# Patient Record
Sex: MALE | Race: BLACK OR AFRICAN AMERICAN | Employment: UNEMPLOYED | RURAL
[De-identification: names, ages, dates, MRNs, and addresses within clinical notes are randomized per-mention and may not be internally consistent; named-entity substitution may affect disease eponyms.]

---

## 2017-02-15 ENCOUNTER — OFFICE VISIT (OUTPATIENT)
Dept: INTERNAL MEDICINE CLINIC | Age: 62
End: 2017-02-15

## 2017-02-15 VITALS
WEIGHT: 120 LBS | TEMPERATURE: 98.5 F | BODY MASS INDEX: 19.29 KG/M2 | RESPIRATION RATE: 16 BRPM | HEART RATE: 76 BPM | DIASTOLIC BLOOD PRESSURE: 101 MMHG | OXYGEN SATURATION: 98 % | SYSTOLIC BLOOD PRESSURE: 158 MMHG | HEIGHT: 66 IN

## 2017-02-15 DIAGNOSIS — M17.11 ARTHRITIS OF KNEE, RIGHT: ICD-10-CM

## 2017-02-15 DIAGNOSIS — I10 ESSENTIAL HYPERTENSION: Primary | ICD-10-CM

## 2017-02-15 DIAGNOSIS — R05.9 COUGH: ICD-10-CM

## 2017-02-15 RX ORDER — HYDROCHLOROTHIAZIDE 25 MG/1
25 TABLET ORAL DAILY
Qty: 90 TAB | Refills: 3 | Status: SHIPPED | OUTPATIENT
Start: 2017-02-15 | End: 2018-02-12 | Stop reason: SDUPTHER

## 2017-02-15 RX ORDER — NAPROXEN 500 MG/1
500 TABLET ORAL 2 TIMES DAILY WITH MEALS
Qty: 60 TAB | Refills: 1 | Status: SHIPPED | OUTPATIENT
Start: 2017-02-15 | End: 2019-06-18 | Stop reason: ALTCHOICE

## 2017-02-15 RX ORDER — AZITHROMYCIN 250 MG/1
250 TABLET, FILM COATED ORAL SEE ADMIN INSTRUCTIONS
Qty: 6 TAB | Refills: 0 | Status: SHIPPED | OUTPATIENT
Start: 2017-02-15 | End: 2017-04-25 | Stop reason: ALTCHOICE

## 2017-02-15 NOTE — PROGRESS NOTES
Subjective:     Franklin Kaur is a 63 y.o. male who presents for follow up of hypertension. He has been out of his blood pressure pills for the last couple of days. New concerns: cough x > 2mo,. No fevers chills night sweats. Cough is nonproductive, he does not think it is a smoker's cough. Complains of his right knee hurting times a few months. No injury. Not tried anything for it. Current Outpatient Prescriptions   Medication Sig Dispense Refill    hydrochlorothiazide (HYDRODIURIL) 25 mg tablet Take 1 Tab by mouth daily. 90 Tab 3    aspirin 81 mg chewable tablet Take 1 Tab by mouth daily. 90 Tab 3     No Known Allergies    Diet and Lifestyle: smoker last cigarette 3 weeks ago    Cardiovascular ROS: not taking medications regularly as instructed, no medication side effects noted, no TIA's, no chest pain on exertion, no dyspnea on exertion, no swelling of ankles. Review of Systems, additional:  Pertinent items are noted in HPI. Patient Active Problem List    Diagnosis Date Noted    Tobacco abuse 05/05/2014    Closed fracture of greater trochanter of femur ([facility] [address] 02/21/2012    Periprosthetic osteolysis of internal prosthetic joint ([facility] [address] 02/21/2012     Current Outpatient Prescriptions   Medication Sig Dispense Refill    hydroCHLOROthiazide (HYDRODIURIL) 25 mg tablet Take 1 Tab by mouth daily. 90 Tab 3    aspirin 81 mg chewable tablet Take 1 Tab by mouth daily.  90 Tab 3     No Known Allergies     Lab Results  Component Value Date/Time   WBC 5.1 12/30/2015 10:33 AM   Hemoglobin (POC) 15.3 06/16/2014 07:34 AM   HGB 14.7 12/30/2015 10:33 AM   Hematocrit (POC) 45 06/16/2014 07:34 AM   HCT 43.4 12/30/2015 10:33 AM   PLATELET 711 87/32/6230 10:33 AM   MCV 90 12/30/2015 10:33 AM       Lab Results  Component Value Date/Time   Cholesterol, total 168 12/30/2015 10:33 AM   HDL Cholesterol 99 12/30/2015 10:33 AM   LDL, calculated 55 12/30/2015 10:33 AM   Triglyceride 70 12/30/2015 10:33 AM       Lab Results  Component Value Date/Time   ALT (SGPT) 19 12/30/2015 10:33 AM   AST (SGOT) 28 12/30/2015 10:33 AM   Alk. phosphatase 85 12/30/2015 10:33 AM   Bilirubin, total 0.5 12/30/2015 10:33 AM       Lab Results  Component Value Date/Time   GFR est  12/30/2015 10:33 AM   GFR est non- 12/30/2015 10:33 AM   Creatinine (POC) 0.7 06/16/2014 07:34 AM   Creatinine 0.71 12/30/2015 10:33 AM   BUN 10 12/30/2015 10:33 AM   BUN (POC) 13 06/16/2014 07:34 AM   Sodium (POC) 140 06/16/2014 07:34 AM   Sodium 142 12/30/2015 10:33 AM   Potassium 4.3 12/30/2015 10:33 AM   Potassium (POC) 3.9 06/16/2014 07:34 AM   Chloride (POC) 105 06/16/2014 07:34 AM   Chloride 98 12/30/2015 10:33 AM   CO2 25 12/30/2015 10:33 AM      Lab Results   Component Value Date/Time    Glucose 124 12/30/2015 10:33 AM    Glucose (POC) 119 06/16/2014 07:34 AM             Objective:     Physical exam significant for the following: WNL  Visit Vitals    BP (!) 158/101 (BP 1 Location: Left arm, BP Patient Position: Sitting)    Pulse 76    Temp 98.5 °F (36.9 °C) (Oral)    Resp 16    Ht 5' 6\" (1.676 m)    Wt 120 lb (54.4 kg)    SpO2 98%    BMI 19.37 kg/m2     Appearance: alert, well appearing, and in no distress. General exam: CVS exam BP noted to be mildly elevated today in office, S1, S2 normal, no gallop, no murmur, chest clear, no JVD, no HSM, no edema. . Both knees look unremarkable no effusion redness swelling. Full range of motion. Negative Lockman or Madeleine. Assessment/Plan:     hypertension patient poorly compliant. ICD-10-CM ICD-9-CM    1. Essential hypertension I10 401.9    2. Cough R05 786.2 XR CHEST PA LAT   3.  Arthritis of knee, right M19.90 716.96 XR KNEE RT MAX 2 VWS     Orders Placed This Encounter    XR CHEST PA LAT     Standing Status:   Future     Standing Expiration Date:   3/19/2018     Order Specific Question:   Reason for Exam     Answer:   cough x 2 months     Order Specific Question:   Is Patient Allergic to Contrast Dye? Answer:   No     Order Specific Question:   Which facility to perform procedure? Answer:   [facility]    XR KNEE RT MAX 2 VWS     Standing Status:   Future     Standing Expiration Date:   3/15/2018     Scheduling Instructions:      [place]     Order Specific Question:   Reason for Exam     Answer:   right knee pain     Order Specific Question:   Is Patient Allergic to Contrast Dye? Answer:   No    hydroCHLOROthiazide (HYDRODIURIL) 25 mg tablet     Sig: Take 1 Tab by mouth daily. Dispense:  90 Tab     Refill:  3    azithromycin (ZITHROMAX) 250 mg tablet     Sig: Take 1 Tab by mouth See Admin Instructions. Take two tablets today then one tablet daily for next 4 days     Dispense:  6 Tab     Refill:  0    naproxen (NAPROSYN) 500 mg tablet     Sig: Take 1 Tab by mouth two (2) times daily (with meals). As needed     Dispense:  60 Tab     Refill:  1     Follow-up Disposition:  Return in about 4 weeks (around 3/15/2017) for routine follow up.

## 2017-02-15 NOTE — PROGRESS NOTES
Chief Complaint   Patient presents with    Hypertension     follow up     I have reviewed the patient's medical history in detail and updated the computerized patient record. Health Maintenance reviewed. 1. Have you been to the ER, urgent care clinic since your last visit? Hospitalized since your last visit?no    2. Have you seen or consulted any other health care providers outside of the [address] [place] [address] since your last visit? Include any pap smears or colon screening.  no

## 2017-02-15 NOTE — MR AVS SNAPSHOT
Visit Information Date & Time Provider Department Dept. Phone Encounter #  
 2/15/2017  2:30 PM Norberto Grady MD [place] [id number] [phone] [id number] Follow-up Instructions Return in about 4 weeks (around 3/15/2017) for routine follow up. Upcoming Health Maintenance Date Due Hepatitis C Screening 1955 DTaP/Tdap/Td series (1 - Tdap) 5/22/1978 FOBT Q 1 YEAR AGE 50-75 5/22/2007 Allergies as of 2/15/2017  Review Complete On: 2/15/2017 By: Dougie Wright LPN No Known Allergies Current Immunizations  Reviewed on 12/18/2015 Name Date Pneumococcal Conjugate (PCV-13) 12/18/2015 Pneumococcal Polysaccharide (PPSV-23) 10/17/2016 TB Skin Test (PPD) 6/1/2008 Not reviewed this visit You Were Diagnosed With   
  
 Codes Comments Essential hypertension    -  Primary ICD-10-CM: I10 
ICD-9-CM: 401.9 Cough     ICD-10-CM: R05 ICD-9-CM: 786.2 Arthritis of knee, right     ICD-10-CM: M19.90 ICD-9-CM: 716.96 Vitals BP Pulse Temp Resp Height(growth percentile) Weight(growth percentile) (!) 158/101 (BP 1 Location: Left arm, BP Patient Position: Sitting) 76 98.5 °F (36.9 °C) (Oral) 16 5' 6\" (1.676 m) 120 lb (54.4 kg) SpO2 BMI Smoking Status 98% 19.37 kg/m2 Former Smoker Vitals History BMI and BSA Data Body Mass Index Body Surface Area  
 19.37 kg/m 2 1.59 m 2 Preferred Pharmacy Pharmacy Name Phone 150 [facility], [address] [place] [id number] [phone] Your Updated Medication List  
  
   
This list is accurate as of: 2/15/17  3:21 PM.  Always use your most recent med list.  
  
  
  
  
 aspirin 81 mg chewable tablet Take 1 Tab by mouth daily. azithromycin 250 mg tablet Commonly known as:  [place] Take 1 Tab by mouth See Admin Instructions. Take two tablets today then one tablet daily for next 4 days  
  
 hydroCHLOROthiazide 25 mg tablet Commonly known as:  HYDRODIURIL Take 1 Tab by mouth daily. naproxen 500 mg tablet Commonly known as:  NAPROSYN Take 1 Tab by mouth two (2) times daily (with meals). As needed Prescriptions Sent to Pharmacy Refills  
 hydroCHLOROthiazide (HYDRODIURIL) 25 mg tablet 3 Sig: Take 1 Tab by mouth daily. Class: Normal  
 Pharmacy: [address] [place] [address] #: [phone] Route: Oral  
 azithromycin (ZITHROMAX) 250 mg tablet 0 Sig: Take 1 Tab by mouth See Admin Instructions. Take two tablets today then one tablet daily for next 4 days Class: Normal  
 Pharmacy: [address] [place] [address] #: [phone] Route: Oral  
 naproxen (NAPROSYN) 500 mg tablet 1 Sig: Take 1 Tab by mouth two (2) times daily (with meals). As needed Class: Normal  
 Pharmacy: [address] [place] [address] #: [phone] Route: Oral  
  
Follow-up Instructions Return in about 4 weeks (around 3/15/2017) for routine follow up. To-Do List   
 02/15/2017 Imaging:  XR CHEST PA LAT   
  
 02/15/2017 Imaging:  XR KNEE RT MAX 2 VWS Introducing [facility] & HEALTH SERVICES! Anna Porter introduces [employer] patient portal. Now you can access parts of your medical record, email your doctor's office, and request medication refills online. 1. In your internet browser, go to [email or web address] 2. Click on the First Time User? Click Here link in the Sign In box. You will see the New Member Sign Up page. 3. Enter your Kit[employer] Access Code exactly as it appears below. You will not need to use this code after youve completed the sign-up process. If you do not sign up before the expiration date, you must request a new code. · [employer] Access Code: [email or web address] Expires: 5/16/2017  3:12 PM 
 
4.  Enter the last four digits of your Social Security Number (xxxx) and Date of Birth (mm/dd/yyyy) as indicated and click Submit. You will be taken to the next sign-up page. 5. Create a Qt Software ID. This will be your Qt Software login ID and cannot be changed, so think of one that is secure and easy to remember. 6. Create a Qt Software password. You can change your password at any time. 7. Enter your Password Reset Question and Answer. This can be used at a later time if you forget your password. 8. Enter your e-mail address. You will receive e-mail notification when new information is available in [address] 9. Click Sign Up. You can now view and download portions of your medical record. 10. Click the Download Summary menu link to download a portable copy of your medical information. If you have questions, please visit the Frequently Asked Questions section of the Qt Software website. Remember, Qt Software is NOT to be used for urgent needs. For medical emergencies, dial 911. Now available from your iPhone and Android! Please provide this summary of care documentation to your next provider. Your primary care clinician is listed as Darrius Wetzel. If you have any questions after today's visit, please call [phone].

## 2017-04-25 ENCOUNTER — OFFICE VISIT (OUTPATIENT)
Dept: INTERNAL MEDICINE CLINIC | Age: 62
End: 2017-04-25

## 2017-04-25 VITALS
BODY MASS INDEX: 19.61 KG/M2 | OXYGEN SATURATION: 99 % | DIASTOLIC BLOOD PRESSURE: 91 MMHG | RESPIRATION RATE: 16 BRPM | SYSTOLIC BLOOD PRESSURE: 140 MMHG | WEIGHT: 122 LBS | HEIGHT: 66 IN | TEMPERATURE: 98.6 F | HEART RATE: 91 BPM

## 2017-04-25 DIAGNOSIS — R22.0 FACIAL SWELLING: ICD-10-CM

## 2017-04-25 DIAGNOSIS — I10 ESSENTIAL HYPERTENSION: Primary | ICD-10-CM

## 2017-04-25 DIAGNOSIS — R05.9 COUGH IN ADULT: ICD-10-CM

## 2017-04-25 RX ORDER — PREDNISONE 10 MG/1
TABLET ORAL
Qty: 20 TAB | Refills: 0 | Status: SHIPPED | OUTPATIENT
Start: 2017-04-25 | End: 2018-02-12 | Stop reason: ALTCHOICE

## 2017-04-25 NOTE — PROGRESS NOTES
HISTORY OF PRESENT ILLNESS  Gianluca Wood is a 63 y.o. male. Facial Swelling   The history is provided by the patient. This is a new problem. Episode onset: 2 weeks ago. The problem occurs daily. The problem has not changed since onset. Pertinent negatives include no chest pain and no shortness of breath. Treatments tried: bendryl. The treatment provided no relief. Cough   This is a new problem. Episode onset: 8 months. The problem occurs daily. The problem has been gradually improving. Pertinent negatives include no chest pain and no shortness of breath. the emergency room did a chest x-ray. Results were negative. He smoked for many years but quit last month. Minimal complaints of wheezing. Reports taking his blood pressure pills. No complaints of chest pain or shortness of breath. No focal weakness. Current Outpatient Prescriptions   Medication Sig Dispense Refill    hydroCHLOROthiazide (HYDRODIURIL) 25 mg tablet Take 1 Tab by mouth daily. 90 Tab 3    aspirin 81 mg chewable tablet Take 1 Tab by mouth daily. 90 Tab 3    naproxen (NAPROSYN) 500 mg tablet Take 1 Tab by mouth two (2) times daily (with meals). As needed 60 Tab 1     No Known Allergies    Social History     Social History    Marital status:      Spouse name: N/A    Number of children: 3    Years of education: N/A     Occupational History    disability Not Employed     Social History Main Topics    Smoking status: Former Smoker     Packs/day: 0.50     Years: 30.00     Types: Cigarettes    Smokeless tobacco: Never Used    Alcohol use No      Comment: rarely    Drug use: 1.00 per week     Special: Cocaine      Comment: pt states that was years ago    Sexual activity: Yes     Partners: Female     Other Topics Concern    Not on file     Social History Narrative    Wife Patricia Gustafson my Pt. Review of Systems   Constitutional: Negative for fever and weight loss. HENT: Positive for facial swelling.  Negative for sore throat. No dysphagia   Respiratory: Positive for cough and sputum production. Negative for hemoptysis and shortness of breath. Cardiovascular: Negative for chest pain. Gastrointestinal: Negative for heartburn. Physical Exam  Visit Vitals    BP (!) 140/91 (BP 1 Location: Left arm, BP Patient Position: Sitting)    Pulse 91    Temp 98.6 °F (37 °C) (Oral)    Resp 16    Ht 5' 6\" (1.676 m)    Wt 122 lb (55.3 kg)    SpO2 99%    BMI 19.69 kg/m2       WDWN NAD  TM clear, throat wnl, no obvious swelling by my physical exam.  Neck no adenopathy  Heart RRR no C/M/R  Lungs CTA  Abdo soft non tender  Ext No redness swelling or edema    ASSESSMENT and PLAN  Encounter Diagnoses   Name Primary?  Essential hypertension Yes    Facial swelling     Cough in adult      Orders Placed This Encounter    predniSONE (DELTASONE) 10 mg tablet     For your allergies use an over the counter preparation like Allegra or Zyrtec which is less sedating then Benedryl. In addition Flonase or Nasacort which are steroid nasal sprays are also available OTC. Call me if the symptoms continue or worsen. Discussed possible side affects, precautions, and drug interactions and possible benefits of the medication(s). Not sure what is causing his symptoms, will see if steroids help. Follow-up Disposition:  Return in about 4 weeks (around 5/23/2017) for routine follow up.

## 2017-04-25 NOTE — MR AVS SNAPSHOT
Visit Information Date & Time Provider Department Dept. Phone Encounter #  
 4/25/2017 11:15 AM Stacy Rivers MD [facility] [place] [place] [id number] Follow-up Instructions Return in about 4 weeks (around 5/23/2017) for routine follow up. Upcoming Health Maintenance Date Due Hepatitis C Screening 1955 DTaP/Tdap/Td series (1 - Tdap) 5/22/1978 FOBT Q 1 YEAR AGE 50-75 5/22/2007 Allergies as of 4/25/2017  Review Complete On: 4/25/2017 By: Stacy Rivers MD  
 No Known Allergies Current Immunizations  Reviewed on 12/18/2015 Name Date Pneumococcal Conjugate (PCV-13) 12/18/2015 Pneumococcal Polysaccharide (PPSV-23) 10/17/2016 TB Skin Test (PPD) 6/1/2008 Not reviewed this visit You Were Diagnosed With   
  
 Codes Comments Essential hypertension    -  Primary ICD-10-CM: I10 
ICD-9-CM: 401.9 Facial swelling     ICD-10-CM: R22.0 ICD-9-CM: 784.2 Cough in adult     ICD-10-CM: R05 ICD-9-CM: 348. 2 Vitals BP Pulse Temp Resp Height(growth percentile) Weight(growth percentile) (!) 140/91 (BP 1 Location: Left arm, BP Patient Position: Sitting) 91 98.6 °F (37 °C) (Oral) 16 5' 6\" (1.676 m) 122 lb (55.3 kg) SpO2 BMI Smoking Status 99% 19.69 kg/m2 Former Smoker BMI and BSA Data Body Mass Index Body Surface Area  
 19.69 kg/m 2 1.6 m 2 Preferred Pharmacy Pharmacy Name Phone 150 [facility], [address] [place] [address] [facility] [phone] Your Updated Medication List  
  
   
This list is accurate as of: 4/25/17 12:13 PM.  Always use your most recent med list.  
  
  
  
  
 aspirin 81 mg chewable tablet Take 1 Tab by mouth daily. hydroCHLOROthiazide 25 mg tablet Commonly known as:  HYDRODIURIL Take 1 Tab by mouth daily. naproxen 500 mg tablet Commonly known as:  NAPROSYN Take 1 Tab by mouth two (2) times daily (with meals). As needed predniSONE 10 mg tablet Commonly known as:  DELTASONE  
4 tabs for 2 day, 3 tabs for 2 days, 2 tabs for 2 days, 1tab for 2 days Prescriptions Sent to Pharmacy Refills  
 predniSONE (DELTASONE) 10 mg tablet 0 Si tabs for 2 day, 3 tabs for 2 days, 2 tabs for 2 days, 1tab for 2 days Class: Normal  
 Pharmacy: [address] [place] [address], [address] [place] [address] #: [phone] Follow-up Instructions Return in about 4 weeks (around 2017) for routine follow up. Patient Instructions For your allergies use an over the counter preparation like Allegra or Zyrtec which is less sedating then Benedryl. In addition Flonase or Nasacort which are steroid nasal sprays are also available OTC. Call me if the symptoms continue or worsen. Introducing [facility] & HEALTH SERVICES! [facility] introduces [employer] [place] patient portal. Now you can access parts of your medical record, email your doctor's office, and request medication refills online. 1. In your internet browser, go to [email or web address] 2. Click on the First Time User? Click Here link in the Sign In box. You will see the New Member Sign Up page. 3. Enter your [employer] [place] Access Code exactly as it appears below. You will not need to use this code after youve completed the sign-up process. If you do not sign up before the expiration date, you must request a new code. · [employer] [place] Access Code: [email or web address] Expires: 2017  4:12 PM 
 
4. Enter the last four digits of your Social Security Number (xxxx) and Date of Birth (mm/dd/yyyy) as indicated and click Submit. You will be taken to the next sign-up page. 5. Create a MediaInterface Dresdent ID. This will be your [employer] [place] login ID and cannot be changed, so think of one that is secure and easy to remember. 6. Create a [employer] [place] password. You can change your password at any time. 7. Enter your Password Reset Question and Answer.  This can be used at a later time if you forget your password. 8. Enter your e-mail address. You will receive e-mail notification when new information is available in [address] 9. Click Sign Up. You can now view and download portions of your medical record. 10. Click the Download Summary menu link to download a portable copy of your medical information. If you have questions, please visit the Frequently Asked Questions section of the [employer]et[employer] website. Remember, [employer] is NOT to be used for urgent needs. For medical emergencies, dial 911. Now available from your iPhone and Android! Please provide this summary of care documentation to your next provider. Your primary care clinician is listed as Marylee Rhodes. If you have any questions after today's visit, please call [phone].

## 2018-02-12 ENCOUNTER — OFFICE VISIT (OUTPATIENT)
Dept: INTERNAL MEDICINE CLINIC | Age: 63
End: 2018-02-12

## 2018-02-12 VITALS
WEIGHT: 118 LBS | TEMPERATURE: 98.4 F | BODY MASS INDEX: 18.96 KG/M2 | HEIGHT: 66 IN | RESPIRATION RATE: 16 BRPM | SYSTOLIC BLOOD PRESSURE: 130 MMHG | OXYGEN SATURATION: 98 % | DIASTOLIC BLOOD PRESSURE: 90 MMHG | HEART RATE: 90 BPM

## 2018-02-12 DIAGNOSIS — Z13.6 SCREENING FOR ISCHEMIC HEART DISEASE: ICD-10-CM

## 2018-02-12 DIAGNOSIS — Z13.31 SCREENING FOR DEPRESSION: ICD-10-CM

## 2018-02-12 DIAGNOSIS — Z72.0 TOBACCO ABUSE: ICD-10-CM

## 2018-02-12 DIAGNOSIS — Z13.39 SCREENING FOR ALCOHOLISM: ICD-10-CM

## 2018-02-12 DIAGNOSIS — Z13.1 SCREENING FOR DIABETES MELLITUS: ICD-10-CM

## 2018-02-12 DIAGNOSIS — Z12.11 SCREENING FOR COLON CANCER: ICD-10-CM

## 2018-02-12 DIAGNOSIS — Z23 ENCOUNTER FOR IMMUNIZATION: ICD-10-CM

## 2018-02-12 DIAGNOSIS — I10 ESSENTIAL HYPERTENSION: ICD-10-CM

## 2018-02-12 DIAGNOSIS — Z00.00 MEDICARE ANNUAL WELLNESS VISIT, SUBSEQUENT: Primary | ICD-10-CM

## 2018-02-12 RX ORDER — HYDROCHLOROTHIAZIDE 25 MG/1
25 TABLET ORAL DAILY
Qty: 90 TAB | Refills: 3 | Status: SHIPPED | OUTPATIENT
Start: 2018-02-12 | End: 2019-05-04 | Stop reason: SDUPTHER

## 2018-02-12 RX ORDER — BUPROPION HYDROCHLORIDE 75 MG/1
75 TABLET ORAL 3 TIMES DAILY
Qty: 90 TAB | Refills: 5 | Status: SHIPPED | OUTPATIENT
Start: 2018-02-12 | End: 2018-10-30 | Stop reason: SDUPTHER

## 2018-02-12 NOTE — PATIENT INSTRUCTIONS
Medicare Wellness Visit, Male    The best way to live healthy is to have a healthy lifestyle by eating a well-balanced diet, exercising regularly, limiting alcohol and stopping smoking. Regular physical exams and screening tests are another way to keep healthy. Preventive exams provided by your health care provider can find health problems before they become diseases or illnesses. Preventive services including immunizations, screening tests, monitoring and exams can help you take care of your own health. All people over age 72 should have a pneumovax  and and a prevnar shot to prevent pneumonia. These are once in a lifetime unless you and your provider decide differently. All people over 65 should have a yearly flu shot and a tetanus vaccine every 10 years. Screening for diabetes mellitus with a blood sugar test should be done every year. Glaucoma is a disease of the eye due to increased ocular pressure that can lead to blindness and it should be done every year by an eye professional.    Cardiovascular screening tests that check for elevated lipids (fatty part of blood) which can lead to heart disease and strokes should be done every 5 years. Colorectal screening that evaluates for blood or polyps in your colon should be done yearly as a stool test or every five years as a flexible sigmoidoscope or every 10 years as a colonoscopy up to age 76. Men up to age 76 may need a screening blood test for prostate cancer at certain intervals, depending on their personal and family history. This decision is between the patient and his provider. If you have been a smoker or had family history of abdominal aortic aneurysms, you and your provider may decide to schedule an ultrasound test of your aorta. Hepatitis C screening is also recommended for anyone born between [birth year] through [address]. A shingles vaccine is also recommended once in a lifetime after age 61.     Your Medicare Wellness Exam is recommended annually. Here is a list of your current Health Maintenance items with a due date:  Health Maintenance Due   Topic Date Due    Hepatitis C Test  1955    DTaP/Tdap/Td  (1 - Tdap) 05/22/1978    Stool testing for trace blood  05/22/2007    Annual Well Visit  12/18/2016    Shingles Vaccine  03/22/2017    Flu Vaccine  08/01/2017          Deciding About Using Medicines To Quit Smoking  Deciding About Using Medicines To Quit Smoking  What are the medicines you can use? Your doctor may prescribe varenicline (Chantix) or bupropion (Zyban). These medicines can help you cope with cravings for tobacco. They are pills that don't contain nicotine. You also can use nicotine replacement products. These do contain nicotine. There are many types. · Gum and lozenges slowly release nicotine into your mouth. · Patches stick to your skin. They slowly release nicotine into your bloodstream.  · An inhaler has a briggs that contains nicotine. You breathe in a puff of nicotine vapor through your mouth and throat. · Nasal spray releases a mist that contains nicotine. What are key points about this decision? · Using medicines can double your chances of quitting smoking. They can ease cravings and withdrawal symptoms. · Getting counseling along with using medicine can raise your chances of quitting even more. · If you smoke fewer than 5 cigarettes a day, you may not need medicines to help you quit smoking. · These medicines have less nicotine than cigarettes. And by itself, nicotine is not nearly as harmful as smoking. The tars, carbon monoxide, and other toxic chemicals in tobacco cause the harmful effects. · The side effects of nicotine replacement products depend on the type of product. For example, a patch can make your skin red and itchy. Medicines in pill form can make you sick to your stomach. They can also cause dry mouth and trouble sleeping.  For most people, the side effects are not bad enough to make them stop using the products. Why might you choose to use medicines to quit smoking? · You have tried on your own to stop smoking, but you were not able to stop. · You smoke more than 5 cigarettes a day. · You want to increase your chances of quitting smoking. · You want to reduce your cravings and withdrawal symptoms. · You feel the benefits of medicine outweigh the side effects. Why might you choose not to use medicine? · You want to try quitting on your own by stopping all at once (\"cold turkey\"). · You want to cut back slowly on the number of cigarettes you smoke. · You smoke fewer than 5 cigarettes a day. · You do not like using medicine. · You feel the side effects of medicines outweigh the benefits. · You are worried about the cost of medicines. Your decision  Thinking about the facts and your feelings can help you make a decision that is right for you. Be sure you understand the benefits and risks of your options, and think about what else you need to do before you make the decision. Where can you learn more? Go to http://layne-mary.info/. Enter B796 in the search box to learn more about \"Deciding About Using Medicines To Quit Smoking. \"  Current as of: March 20, 2017  Content Version: 11.4  © 5615-6795 Healthwise, Incorporated. Care instructions adapted under license by [employer] (which disclaims liability or warranty for this information). If you have questions about a medical condition or this instruction, always ask your healthcare professional. [place] [address] any warranty or liability for your use of this information.

## 2018-02-12 NOTE — PROGRESS NOTES
This is a Subsequent Medicare Annual Wellness Exam (AWV) (Performed 12 months after IPPE or effective date of Medicare Part B enrollment)    I have reviewed the patient's medical history in detail and updated the computerized patient record. History     Feels well in general.  Reports taking blood pressure medications without side affects. No complaints of exertional chest pain, excessive shortness of breath or focal weakness. Minimal swelling in lower legs or dizziness with standing. Past Medical History:   Diagnosis Date    Chest pain     Cold sensitivity     to fingers    Coughing     Hypertension       Past Surgical History:   Procedure Laterality Date    HX ORTHOPAEDIC      right hip replacement; [address]    HX SKIN BIOPSY      lung     Current Outpatient Prescriptions   Medication Sig Dispense Refill    hydroCHLOROthiazide (HYDRODIURIL) 25 mg tablet Take 1 Tab by mouth daily. 90 Tab 3    naproxen (NAPROSYN) 500 mg tablet Take 1 Tab by mouth two (2) times daily (with meals). As needed 60 Tab 1    aspirin 81 mg chewable tablet Take 1 Tab by mouth daily.  90 Tab 3    predniSONE (DELTASONE) 10 mg tablet 4 tabs for 2 day, 3 tabs for 2 days, 2 tabs for 2 days, 1tab for 2 days 20 Tab 0     No Known Allergies  Family History   Problem Relation Age of Onset    Hypertension Mother     Kidney Disease Mother     Diabetes Sister     Diabetes Brother      Social History   Substance Use Topics    Smoking status: Light Tobacco Smoker     Packs/day: 0.50     Years: 30.00     Types: Cigarettes     Start date: 01/1974     Last attempt to quit: 3/25/2017    Smokeless tobacco: Never Used    Alcohol use 12.6 oz/week     21 Standard drinks or equivalent per week      Comment: rarely     Patient Active Problem List   Diagnosis Code    Closed fracture of greater trochanter of femur ([facility] [address] S72.113A    Periprosthetic osteolysis of internal prosthetic joint ([address] T84.059A    Essential hypertension I10 Depression Risk Factor Screening:     PHQ over the last two weeks 2/12/2018   Little interest or pleasure in doing things Several days   Feeling down, depressed or hopeless -   Total Score PHQ 2 -     Alcohol Risk Factor Screening: You average more than 14 drinks a week. Functional Ability and Level of Safety:   Hearing Loss  Hearing is good. Activities of Daily Living  The home contains: no safety equipment. Patient does total self care    Fall Risk  Fall Risk Assessment, last 12 mths 2/12/2018   Able to walk? Yes   Fall in past 12 months? No       Abuse Screen  Patient is not abused    Cognitive Screening   Evaluation of Cognitive Function:  Has your family/caregiver stated any concerns about your memory: no  Abnormal  2/18/18 Mon winter  Patient Care Team   Patient Care Team:  Prema Parson MD as PCP - General (Family Practice)    Assessment/Plan   Education and counseling provided:  Influenza Vaccine  Colorectal cancer screening tests  We discussed a screening exam lung CT and the  the pros and cons of having the test done. The patient made a decision to do the test: no and he'll think about it. ICD-10-CM ICD-9-CM    1. Medicare annual wellness visit, subsequent Z00.00 V70.0    2. Screening for alcoholism Z13.89 V79.1 ME ANNUAL ALCOHOL SCREEN 15 MIN   3. Screening for depression Z13.89 V79.0 DEPRESSION SCREEN ANNUAL   4. Screening for diabetes mellitus Z13.1 V77.1    5. Screening for ischemic heart disease Z13.6 V81.0 CANCELED: LIPID PANEL   6. Encounter for immunization Z23 V03.89 INFLUENZA VIRUS VAC QUAD,SPLIT,PRESV FREE SYRINGE IM   7. Tobacco abuse Z72.0 305.1    8. Screening for colon cancer Z12.11 V76.51 OCCULT BLOOD, IMMUNOASSAY (FIT)   9.  Essential hypertension I10 401.9      Orders Placed This Encounter    Depression Screen Annual    Influenza virus vaccine (QUADRIVALENT PRES FREE SYRINGE) IM (05359)    OCCULT BLOOD, IMMUNOASSAY (FIT)    Annual  Alcohol Screen 15 min ()    buPROPion (WELLBUTRIN) 75 mg tablet     Sig: Take 1 Tab by mouth three (3) times daily. Start 1 tablet daily, usually in the evening, every few days increase as tolerated, quit. Dispense:  90 Tab     Refill:  5    hydroCHLOROthiazide (HYDRODIURIL) 25 mg tablet     Sig: Take 1 Tab by mouth daily. Dispense:  90 Tab     Refill:  3     The patient was counseled on the dangers of tobacco use, and was advised to quit. Reviewed strategies to maximize success, including pharmacotherapy (wellbutrin).     Health Maintenance Due   Topic Date Due    Hepatitis C Screening  1955    DTaP/Tdap/Td series (1 - Tdap) 05/22/1978    FOBT Q 1 YEAR AGE 50-75  05/22/2007    MEDICARE YEARLY EXAM  12/18/2016    ZOSTER VACCINE AGE 60>  03/22/2017    Influenza Age 9 to Adult  08/01/2017

## 2018-02-12 NOTE — PROGRESS NOTES
Chief Complaint   Patient presents with   Gerhard Lao Annual Wellness Visit     I have reviewed the patient's medical history in detail and updated the computerized patient record. Health Maintenance reviewed. 1. Have you been to the ER, urgent care clinic since your last visit? Hospitalized since your last visit?no    2. Have you seen or consulted any other health care providers outside of the [facility] since your last visit? Include any pap smears or colon screening. No    Encouraged pt to discuss pt's wishes with spouse/partner/family and bring them in the next appt to follow thru with the Advanced Directive    Fall Risk Assessment, last 12 mths 2/12/2018   Able to walk? Yes   Fall in past 12 months? No       PHQ over the last two weeks 2/12/2018   Little interest or pleasure in doing things Several days   Feeling down, depressed or hopeless -   Total Score PHQ 2 -       Abuse Screening Questionnaire 2/12/2018   Do you ever feel afraid of your partner? N   Are you in a relationship with someone who physically or mentally threatens you? N   Is it safe for you to go home?  Y       ADL Assessment 2/12/2018   Feeding yourself No Help Needed   Getting from bed to chair No Help Needed   Getting dressed No Help Needed   Bathing or showering No Help Needed   Walk across the room (includes cane/walker) No Help Needed   Using the telphone No Help Needed   Taking your medications No Help Needed   Preparing meals No Help Needed   Managing money (expenses/bills) No Help Needed   Moderately strenuous housework (laundry) No Help Needed   Shopping for personal items (toiletries/medicines) No Help Needed   Shopping for groceries No Help Needed   Driving Help Needed   Climbing a flight of stairs No Help Needed   Getting to places beyond walking distances Help Needed

## 2018-10-30 ENCOUNTER — OFFICE VISIT (OUTPATIENT)
Dept: INTERNAL MEDICINE CLINIC | Age: 63
End: 2018-10-30

## 2018-10-30 VITALS
SYSTOLIC BLOOD PRESSURE: 140 MMHG | OXYGEN SATURATION: 99 % | HEART RATE: 79 BPM | RESPIRATION RATE: 16 BRPM | TEMPERATURE: 96.3 F | DIASTOLIC BLOOD PRESSURE: 79 MMHG | WEIGHT: 116 LBS | BODY MASS INDEX: 18.64 KG/M2 | HEIGHT: 66 IN

## 2018-10-30 DIAGNOSIS — I10 ESSENTIAL HYPERTENSION: ICD-10-CM

## 2018-10-30 DIAGNOSIS — J30.1 SEASONAL ALLERGIC RHINITIS DUE TO POLLEN: Primary | ICD-10-CM

## 2018-10-30 DIAGNOSIS — Z23 ENCOUNTER FOR IMMUNIZATION: ICD-10-CM

## 2018-10-30 DIAGNOSIS — Z72.0 TOBACCO ABUSE: ICD-10-CM

## 2018-10-30 RX ORDER — BUPROPION HYDROCHLORIDE 75 MG/1
75 TABLET ORAL 3 TIMES DAILY
Qty: 90 TAB | Refills: 5 | Status: SHIPPED | OUTPATIENT
Start: 2018-10-30 | End: 2019-06-18 | Stop reason: ALTCHOICE

## 2018-10-30 RX ORDER — PREDNISONE 20 MG/1
40 TABLET ORAL
Qty: 10 TAB | Refills: 0 | Status: SHIPPED | OUTPATIENT
Start: 2018-10-30 | End: 2018-11-04

## 2018-10-30 NOTE — PROGRESS NOTES
Chief Complaint Patient presents with  Cough  
  prod cough thick yellow sputum, throat sore, chest conjestion, ears feel full, nose running, fever and chills I have reviewed the patient's medical history in detail and updated the computerized patient record. Health Maintenance reviewed. 1. Have you been to the ER, urgent care clinic since your last visit? Hospitalized since your last visit?no 2. Have you seen or consulted any other health care providers outside of the [address] [place] [address] [place] since your last visit? Include any pap smears or colon screening. No 
 
 
Encouraged pt to discuss pt's wishes with spouse/partner/family and bring them in the next appt to follow thru with the Advanced Directive Fall Risk Assessment, last 12 mths 2/12/2018 Able to walk? Yes Fall in past 12 months? No  
 
 
PHQ over the last two weeks 10/30/2018 Little interest or pleasure in doing things Several days Feeling down, depressed, irritable, or hopeless Several days Total Score PHQ 2 2 Abuse Screening Questionnaire 2/12/2018 Do you ever feel afraid of your partner? Mendel Delvis Are you in a relationship with someone who physically or mentally threatens you? Mendel Delvis Is it safe for you to go home? Y  
 
 
ADL Assessment 2/12/2018 Feeding yourself No Help Needed Getting from bed to chair No Help Needed Getting dressed No Help Needed Bathing or showering No Help Needed Walk across the room (includes cane/walker) No Help Needed Using the telphone No Help Needed Taking your medications No Help Needed Preparing meals No Help Needed Managing money (expenses/bills) No Help Needed Moderately strenuous housework (laundry) No Help Needed Shopping for personal items (toiletries/medicines) No Help Needed Shopping for groceries No Help Needed Driving Help Needed Climbing a flight of stairs No Help Needed Getting to places beyond walking distances Help Needed

## 2018-10-30 NOTE — PATIENT INSTRUCTIONS
Vaccine Information Statement Influenza (Flu) Vaccine (Inactivated or Recombinant): What you need to know Many Vaccine Information Statements are available in Portuguese and other languages. See www.immunize.org/vis Hojas de Información Sobre Vacunas están disponibles en Español y en muchos otros idiomas. Visite www.immunize.org/vis 1. Why get vaccinated? Influenza (flu) is a contagious disease that spreads around the [place] every year, usually between October and May. Flu is caused by influenza viruses, and is spread mainly by coughing, sneezing, and close contact. Anyone can get flu. Flu strikes suddenly and can last several days. Symptoms vary by age, but can include: 
 fever/chills  sore throat  muscle aches  fatigue  cough  headache  runny or stuffy nose Flu can also lead to pneumonia and blood infections, and cause diarrhea and seizures in children. If you have a medical condition, such as heart or lung disease, flu can make it worse. Flu is more dangerous for some people. Infants and young children, people 72years of age and older, pregnant women, and people with certain health conditions or a weakened immune system are at greatest risk. Each year thousands of people in the [facility] die from flu, and many more are hospitalized. Flu vaccine can: 
 keep you from getting flu, 
 make flu less severe if you do get it, and 
 keep you from spreading flu to your family and other people. 2. Inactivated and recombinant flu vaccines A dose of flu vaccine is recommended every flu season. Children 6 months through 6years of age may need two doses during the same flu season. Everyone else needs only one dose each flu season.   
 
 
Some inactivated flu vaccines contain a very small amount of a mercury-based preservative called thimerosal. Studies have not shown thimerosal in vaccines to be harmful, but flu vaccines that do not contain thimerosal are available. There is no live flu virus in flu shots. They cannot cause the flu. There are many flu viruses, and they are always changing. Each year a new flu vaccine is made to protect against three or four viruses that are likely to cause disease in the upcoming flu season. But even when the vaccine doesnt exactly match these viruses, it may still provide some protection Flu vaccine cannot prevent: 
 flu that is caused by a virus not covered by the vaccine, or 
 illnesses that look like flu but are not. It takes about 2 weeks for protection to develop after vaccination, and protection lasts through the flu season. 3. Some people should not get this vaccine Tell the person who is giving you the vaccine:  If you have any severe, life-threatening allergies. If you ever had a life-threatening allergic reaction after a dose of flu vaccine, or have a severe allergy to any part of this vaccine, you may be advised not to get vaccinated. Most, but not all, types of flu vaccine contain a small amount of egg protein.  If you ever had Guillain-Barré Syndrome (also called GBS). Some people with a history of GBS should not get this vaccine. This should be discussed with your doctor.  If you are not feeling well. It is usually okay to get flu vaccine when you have a mild illness, but you might be asked to come back when you feel better. 4. Risks of a vaccine reaction With any medicine, including vaccines, there is a chance of reactions. These are usually mild and go away on their own, but serious reactions are also possible. Most people who get a flu shot do not have any problems with it. Minor problems following a flu shot include:  
 soreness, redness, or swelling where the shot was given  hoarseness  sore, red or itchy eyes  cough  fever  aches  headache  itching  fatigue If these problems occur, they usually begin soon after the shot and last 1 or 2 days. More serious problems following a flu shot can include the following:  There may be a small increased risk of Guillain-Barré Syndrome (GBS) after inactivated flu vaccine. This risk has been estimated at 1 or 2 additional cases per million people vaccinated. This is much lower than the risk of severe complications from flu, which can be prevented by flu vaccine.  Young children who get the flu shot along with pneumococcal vaccine (PCV13) and/or DTaP vaccine at the same time might be slightly more likely to have a seizure caused by fever. Ask your doctor for more information. Tell your doctor if a child who is getting flu vaccine has ever had a seizure. Problems that could happen after any injected vaccine:  People sometimes faint after a medical procedure, including vaccination. Sitting or lying down for about 15 minutes can help prevent fainting, and injuries caused by a fall. Tell your doctor if you feel dizzy, or have vision changes or ringing in the ears.  Some people get severe pain in the shoulder and have difficulty moving the arm where a shot was given. This happens very rarely.  Any medication can cause a severe allergic reaction. Such reactions from a vaccine are very rare, estimated at about 1 in a million doses, and would happen within a few minutes to a few hours after the vaccination. As with any medicine, there is a very remote chance of a vaccine causing a serious injury or death. The safety of vaccines is always being monitored. For more information, visit: www.cdc.gov/vaccinesafety/ 
 
5. What if there is a serious reaction? What should I look for?  Look for anything that concerns you, such as signs of a severe allergic reaction, very high fever, or unusual behavior.  
 
Signs of a severe allergic reaction can include hives, swelling of the face and throat, difficulty breathing, a fast heartbeat, dizziness, and weakness  usually within a few minutes to a few hours after the vaccination. What should I do?  If you think it is a severe allergic reaction or other emergency that cant wait, call 9-1-1 and get the person to the nearest hospital. Otherwise, call your doctor.  Reactions should be reported to the Vaccine Adverse Event Reporting System (VAERS). Your doctor should file this report, or you can do it yourself through  the VAERS web site at www.vaers. [facility].gov, or by calling 0-618.402.4221. VAERS does not give medical advice. 6. The National Vaccine Injury Compensation Program 
 
The [facility] Vaccine Injury Compensation Program (VICP) is a federal program that was created to compensate people who may have been injured by certain vaccines. Persons who believe they may have been injured by a vaccine can learn about the program and about filing a claim by calling 3-758.540.2361 or visiting the [address] website at www.[email or web address].gov/vaccinecompensation. There is a time limit to file a claim for compensation. 7. How can I learn more?  Ask your healthcare provider. He or she can give you the vaccine package insert or suggest other sources of information.  Call your local or state health department.  Contact the Centers for Disease Control and Prevention (CDC): 
- Call 2-213.862.2762 (1-800-CDC-INFO) or 
- Visit CDCs website at www.cdc.gov/flu Vaccine Information Statement Inactivated Influenza Vaccine 8/7/2015 
[id number] Department of [facility] and [place] Centers for Disease Control and Prevention Office Use Only

## 2018-10-30 NOTE — PROGRESS NOTES
Subjective:  
Zechariah Sawyer is a 66 y.o. male who complains of sore throat, post nasal drip, cough described as productive of clear sputum and chills for 7-8 days, gradually improving since that time. He denies a history of fevers and wheezing. He is interested in quitting smoking. Previously quit but unfortunately restarted it Evaluation to date: none. Treatment to date: OTC products. Relevant PMH: COPD. No Known Allergies Patient Active Problem List  
 Diagnosis Date Noted  Essential hypertension 2017  Closed fracture of greater trochanter of femur ([facility] [address] 2012  Periprosthetic osteolysis of internal prosthetic joint ([facility] [address] 2012 No Known Allergies Social History Socioeconomic History  Marital status:  Spouse name: Not on file  Number of children: 3  
 Years of education: Not on file  Highest education level: Not on file Social Needs  Financial resource strain: Not on file  Food insecurity - worry: Not on file  Food insecurity - inability: Not on file  Transportation needs - medical: Not on file  Transportation needs - non-medical: Not on file Occupational History  Occupation: disability Employer: NOT EMPLOYED Tobacco Use  Smoking status: Current Some Day Smoker Packs/day: 0.50 Years: 30.00 Pack years: 15.00 Types: Cigarettes Start date: 1974 Last attempt to quit: 10/26/2018 Years since quittin.0  Smokeless tobacco: Never Used Substance and Sexual Activity  Alcohol use: Yes Alcohol/week: 12.6 oz Types: 21 Standard drinks or equivalent per week Comment: rarely  Drug use: Yes Frequency: 1.0 times per week Types: Cocaine Comment: pt states that was years ago  Sexual activity: Yes  
  Partners: Female Other Topics Concern  Not on file Social History Narrative Friend stays with him Review of Systems Pertinent items are noted in HPI. Objective:  
 
Visit Vitals /79 (BP 1 Location: Left arm, BP Patient Position: Sitting) Pulse 79 Temp 96.3 °F (35.7 °C) (Temporal) Resp 16 Ht 5' 6\" (1.676 m) Wt 116 lb (52.6 kg) SpO2 99% BMI 18.72 kg/m² General:  alert, cooperative, no distress Eyes: negative Ears: normal TM's and external ear canals AU Sinuses: Normal paranasal sinuses without tenderness Mouth:  Lips, mucosa, and tongue normal. Teeth and gums normal  
Neck: supple, symmetrical, trachea midline and no adenopathy. Heart: S1 and S2 normal, no murmurs noted. Lungs: clear to auscultation bilaterally Abdomen: soft, non-tender. Bowel sounds normal. No masses,  no organomegaly Assessment/Plan:  
allergic rhinitis Suggested symptomatic OTC remedies. RTC prn. Discussed diagnosis and treatment of viral URIs. Discussed the importance of avoiding unnecessary antibiotic therapy. Discussed possible side affects, precautions, and drug interactions and possible benefits of the medication(s). ICD-10-CM ICD-9-CM 1. Seasonal allergic rhinitis due to pollen J30.1 477.0 2. Essential hypertension I10 401.9 3. Encounter for immunization Z23 V03.89 INFLUENZA VIRUS VACCINE, PRESERVATIVE FREE SYRINGE, 3 YRS AND OLDER  
4. Tobacco abuse Z72.0 305.1 buPROPion (WELLBUTRIN) 75 mg tablet Orders Placed This Encounter  INFLUENZA VIRUS VACCINE, PRESERVATIVE FREE SYRINGE, 3 YRS AND OLDER  predniSONE (DELTASONE) 20 mg tablet Sig: Take 2 Tabs by mouth daily (with breakfast) for 5 days. Dispense:  10 Tab Refill:  0  
 buPROPion (WELLBUTRIN) 75 mg tablet Sig: Take 1 Tab by mouth three (3) times daily. Dispense:  90 Tab Refill:  5 Hypertension stable The patient was counseled on the dangers of tobacco use, and was advised to quit. Reviewed strategies to maximize success, including pharmacotherapy (wellbutrinb). Follow-up Disposition: Return if symptoms worsen or fail to improve.

## 2019-06-05 ENCOUNTER — DOCUMENTATION ONLY (OUTPATIENT)
Dept: INTERNAL MEDICINE CLINIC | Age: 64
End: 2019-06-05

## 2019-06-18 ENCOUNTER — OFFICE VISIT (OUTPATIENT)
Dept: INTERNAL MEDICINE CLINIC | Age: 64
End: 2019-06-18

## 2019-06-18 VITALS
WEIGHT: 122 LBS | OXYGEN SATURATION: 96 % | RESPIRATION RATE: 16 BRPM | BODY MASS INDEX: 19.61 KG/M2 | HEART RATE: 87 BPM | HEIGHT: 66 IN | TEMPERATURE: 98.3 F | DIASTOLIC BLOOD PRESSURE: 95 MMHG | SYSTOLIC BLOOD PRESSURE: 138 MMHG

## 2019-06-18 DIAGNOSIS — R05.9 COUGH: ICD-10-CM

## 2019-06-18 DIAGNOSIS — Z13.39 SCREENING FOR ALCOHOLISM: ICD-10-CM

## 2019-06-18 DIAGNOSIS — Z12.5 SCREENING FOR PROSTATE CANCER: ICD-10-CM

## 2019-06-18 DIAGNOSIS — Z12.5 SCREENING PSA (PROSTATE SPECIFIC ANTIGEN): ICD-10-CM

## 2019-06-18 DIAGNOSIS — Z00.00 MEDICARE ANNUAL WELLNESS VISIT, SUBSEQUENT: Primary | ICD-10-CM

## 2019-06-18 DIAGNOSIS — Z13.6 SCREENING FOR ISCHEMIC HEART DISEASE: ICD-10-CM

## 2019-06-18 DIAGNOSIS — I10 ESSENTIAL HYPERTENSION: ICD-10-CM

## 2019-06-18 DIAGNOSIS — Z12.11 SCREENING FOR COLON CANCER: ICD-10-CM

## 2019-06-18 DIAGNOSIS — Z72.0 TOBACCO ABUSE: ICD-10-CM

## 2019-06-18 DIAGNOSIS — Z13.31 SCREENING FOR DEPRESSION: ICD-10-CM

## 2019-06-18 NOTE — PATIENT INSTRUCTIONS
Medicare Wellness Visit, Male The best way to live healthy is to have a lifestyle where you eat a well-balanced diet, exercise regularly, limit alcohol use, and quit all forms of tobacco/nicotine, if applicable. Regular preventive services are another way to keep healthy. Preventive services (vaccines, screening tests, monitoring & exams) can help personalize your care plan, which helps you manage your own care. Screening tests can find health problems at the earliest stages, when they are easiest to treat. [address] follows the current, evidence-based guidelines published by the [facility] Nadir Tiburcio ([facility]) when recommending preventive services for our patients. Because we follow these guidelines, sometimes recommendations change over time as research supports it. (For example, a prostate screening blood test is no longer routinely recommended for men with no symptoms.) Of course, you and your doctor may decide to screen more often for some diseases, based on your risk and co-morbidities (chronic disease you are already diagnosed with). Preventive services for you include: - Medicare offers their members a free annual wellness visit, which is time for you and your primary care provider to discuss and plan for your preventive service needs. Take advantage of this benefit every year! 
-All adults over age 72 should receive the recommended pneumonia vaccines. Current USPSTF guidelines recommend a series of two vaccines for the best pneumonia protection.  
-All adults should have a flu vaccine yearly and an ECG.  All adults age 61 and older should receive a shingles vaccine once in their lifetime.   
-All adults age 38-68 who are overweight should have a diabetes screening test once every three years.  
-Other screening tests & preventive services for persons with diabetes include: an eye exam to screen for diabetic retinopathy, a kidney function test, a foot exam, and stricter control over your cholesterol.  
-Cardiovascular screening for adults with routine risk involves an electrocardiogram (ECG) at intervals determined by the provider.  
-Colorectal cancer screening should be done for adults age 54-65 with no increased risk factors for colorectal cancer. There are a number of acceptable methods of screening for this type of cancer. Each test has its own benefits and drawbacks. Discuss with your provider what is most appropriate for you during your annual wellness visit. The different tests include: colonoscopy (considered the best screening method), a fecal occult blood test, a fecal DNA test, and sigmoidoscopy. 
-All adults born between [facility] should be screened once for Hepatitis C. 
-An Abdominal Aortic Aneurysm (AAA) Screening is recommended for men age 73-68 who has ever smoked in their lifetime. Here is a list of your current Health Maintenance items (your personalized list of preventive services) with a due date: 
Health Maintenance Due Topic Date Due  
 Hepatitis C Test  1955  
 DTaP/Tdap/Td  (1 - Tdap) 05/22/1978  Shingles Vaccine (1 of 2) 05/22/2007  Stool testing for trace blood  05/22/2007 [facility] Annual Well Visit  02/13/2019 Honey or agave nectar is known to help a cough, 1 teaspoon every 4 hours as needed for cough. Medicines like Mucinex Theraflu or Coricidin may help. Cold symptoms get better on there own without antibiotics. Over the counter cold medications should not be used for children.

## 2019-06-18 NOTE — PROGRESS NOTES
Chief Complaint   Patient presents with    Annual Wellness Visit    Cough     Clock Draw Test Done    I have reviewed the patient's medical history in detail and updated the computerized patient record. Health Maintenance reviewed. 1. Have you been to the ER, urgent care clinic since your last visit? Hospitalized since your last visit?no    2. Have you seen or consulted any other health care providers outside of the [address] [place] [address] [place] since your last visit? Include any pap smears or colon screening. No      Encouraged pt to discuss pt's wishes with spouse/partner/family and bring them in the next appt to follow thru with the Advanced Directive    Fall Risk Assessment, last 12 mths 6/18/2019   Able to walk? Yes   Fall in past 12 months? No       3 most recent PHQ Screens 6/18/2019   Little interest or pleasure in doing things Several days   Feeling down, depressed, irritable, or hopeless Several days   Total Score PHQ 2 2       Abuse Screening Questionnaire 6/18/2019   Do you ever feel afraid of your partner? N   Are you in a relationship with someone who physically or mentally threatens you? N   Is it safe for you to go home?  Y       ADL Assessment 6/18/2019   Feeding yourself No Help Needed   Getting from bed to chair No Help Needed   Getting dressed No Help Needed   Bathing or showering No Help Needed   Walk across the room (includes cane/walker) No Help Needed   Using the telphone No Help Needed   Taking your medications No Help Needed   Preparing meals No Help Needed   Managing money (expenses/bills) No Help Needed   Moderately strenuous housework (laundry) No Help Needed   Shopping for personal items (toiletries/medicines) No Help Needed   Shopping for groceries No Help Needed   Driving Help Needed   Climbing a flight of stairs No Help Needed   Getting to places beyond walking distances Help Needed

## 2019-06-18 NOTE — PROGRESS NOTES
Subjective:   Mervat Gerard is a 60 y.o. male who complains of cough described as productive of clear sputum for 180 days, unchanged since that time. He denies a history of anorexia, chest pain, fevers, shortness of breath, sweats, weight loss and wheezing. Sticking sensation in throat. No dysphagia. Evaluation to date: none. Treatment to date: none. Patient does and down 2 cigs daily smoke cigarettes. Relevant PMH: No pertinent additional PMH. No Known Allergies      Patient Active Problem List    Diagnosis Date Noted    Essential hypertension 2017    Closed fracture of greater trochanter of femur ([facility] 2012    [facility]ip[facility] internal [facility] 2012     Current Outpatient Medications   Medication Sig Dispense Refill    hydroCHLOROthiazide (HYDRODIURIL) 25 mg tablet TAKE ONE TABLET BY MOUTH DAILY 90 Tab 0    aspirin 81 mg chewable tablet Take 1 Tab by mouth daily. 90 Tab 3     No Known Allergies  Social History     Tobacco Use    Smoking status: Current Some Day Smoker     Packs/day: 0.50     Years: 45.00     Pack years: 22.50     Types: Cigarettes     Start date: 1974     Last attempt to quit: 10/26/2018     Years since quittin.6    Smokeless tobacco: Never Used   Substance Use Topics    Alcohol use: Yes     Alcohol/week: 12.6 oz     Types: 21 Standard drinks or equivalent per week     Comment: rarely        Review of Systems  Pertinent items are noted in HPI. Objective:       Visit Vitals  BP (!) 138/95   Pulse 87   Temp 98.3 °F (36.8 °C) (Oral)   Resp 16   Ht 5' 6\" (1.676 m)   Wt 122 lb (55.3 kg)   SpO2 96%   BMI 19.69 kg/m²       WDWN NAD  TM clear, throat wnl  Neck no adenopathy  Heart RRR no C/M/R  Lungs CTA  Abdo soft non tender  Ext No redness swelling or edema    Assessment/Plan:     COugh persistent  Work-up to include a chest x-ray teen labs but I wanted to get otolaryngology to exam his vocal cords.   Discussed the importance of avoiding unnecessary antibiotic therapy. May consider a trial of antibiotics after we get the results    Encounter Diagnoses   Name Primary?  Medicare annual wellness visit, subsequent Yes    Cough     Essential hypertension     Tobacco abuse     Screening for alcoholism     Screening for depression     Screening for ischemic heart disease     Screening for colon cancer     Screening PSA (prostate specific antigen)     Screening for prostate cancer      Orders Placed This Encounter    Depression Screen Annual    XR CHEST PA LAT    CBC WITH AUTOMATED DIFF    METABOLIC PANEL, COMPREHENSIVE    Lipid Panel ([id number])    OCCULT BLOOD IMMUNOASSAY,DIAGNOSTIC    CVD REPORT    REFERRAL TO ENT-OTOLARYNGOLOGY   . Follow-up and Dispositions    · Return in about 1 month (around 7/16/2019) for routine follow up. This is the Subsequent Medicare Annual Wellness Exam, performed 12 months or more after the Initial AWV or the last Subsequent AWV    I have reviewed the patient's medical history in detail and updated the computerized patient record. History     Past Medical History:   Diagnosis Date    Chest pain     Cold sensitivity     to fingers    Coughing     Hypertension       Past Surgical History:   Procedure Laterality Date    HX ORTHOPAEDIC      right hip replacement; [facility]    HX SKIN BIOPSY      lung     Current Outpatient Medications   Medication Sig Dispense Refill    hydroCHLOROthiazide (HYDRODIURIL) 25 mg tablet TAKE ONE TABLET BY MOUTH DAILY 90 Tab 0    aspirin 81 mg chewable tablet Take 1 Tab by mouth daily.  80 Tab 3     No Known Allergies  Family History   Problem Relation Age of Onset   [facility] Hypertension Mother     Kidney Disease Mother     Diabetes Sister     Diabetes Brother      Social History     Tobacco Use    Smoking status: Current Some Day Smoker     Packs/day: 0.50     Years: 45.00     Pack years: 22.50     Types: Cigarettes     Start date: 01/1974 Last attempt to quit: 10/26/2018     Years since quittin.6    Smokeless tobacco: Never Used   Substance Use Topics    Alcohol use: Yes     Alcohol/week: 13.8 oz     Types: 21 Standard drinks or equivalent, 2 Cans of beer per week     Comment: rarely     Patient Active Problem List   Diagnosis Code    Closed fracture of greater trochanter of femur (HCC) S72.113A    Periprosthetic osteolysis of internal prosthetic joint ([facility] [address] T84.059A    Essential hypertension I10       Depression Risk Factor Screening:     3 most recent PHQ Screens 2019   Little interest or pleasure in doing things Several days   Feeling down, depressed, irritable, or hopeless Several days   Total Score PHQ 2 2     Alcohol Risk Factor Screening: You average more than 14 drinks a week. No, has reduced    Functional Ability and Level of Safety:   Hearing Loss  Hearing is good. Activities of Daily Living  The home contains: no safety equipment. Patient does total self care    Fall Risk  Fall Risk Assessment, last 12 mths 2019   Able to walk? Yes   Fall in past 12 months? No       Abuse Screen  Patient is not abused    Cognitive Screening   Evaluation of Cognitive Function:  Has your family/caregiver stated any concerns about your memory: no  Normal, Clock Drawing test    Patient Care Team   Patient Care Team:  Katie Herrera MD as PCP - General (Family Practice)    Assessment/Plan   Education and counseling provided:  Are appropriate based on today's review and evaluation  Prostate cancer screening tests (PSA, covered annually)  Colorectal cancer screening tests  Cardiovascular screening blood test  Diabetes screening test    Diagnoses and all orders for this visit:    1. Cough  -     CBC WITH AUTOMATED DIFF  -     METABOLIC PANEL, COMPREHENSIVE  -     XR CHEST PA LAT; Future  -     REFERRAL TO ENT-OTOLARYNGOLOGY    2. Essential hypertension    3. Tobacco abuse    4. Medicare annual wellness visit, subsequent    5.  Screening for alcoholism  -     [place] ANNUAL ALCOHOL SCREEN 15 MIN    6. Screening for depression  -     DEPRESSION SCREEN ANNUAL    7.  Screening for ischemic heart disease  -     LIPID PANEL        Health Maintenance Due   Topic Date Due    Hepatitis C Screening  1955    DTaP/Tdap/Td series (1 - Tdap) 05/22/1978    Shingrix Vaccine Age 50> (1 of 2) 05/22/2007    FOBT Q 1 YEAR AGE 50-75  05/22/2007    MEDICARE YEARLY EXAM  02/13/2019

## 2019-06-19 LAB
ALBUMIN SERPL-MCNC: 4.4 G/DL (ref 3.6–4.8)
ALBUMIN/GLOB SERPL: 1.5 {RATIO} (ref 1.2–2.2)
ALP SERPL-CCNC: 70 IU/L (ref 39–117)
ALT SERPL-CCNC: 28 IU/L (ref 0–44)
AST SERPL-CCNC: 45 IU/L (ref 0–40)
BASOPHILS # BLD AUTO: 0 X10E3/UL (ref 0–0.2)
BASOPHILS NFR BLD AUTO: 0 %
BILIRUB SERPL-MCNC: 0.4 MG/DL (ref 0–1.2)
BUN SERPL-MCNC: 8 MG/DL (ref 8–27)
BUN/CREAT SERPL: 13 (ref 10–24)
CALCIUM SERPL-MCNC: 9.6 MG/DL (ref 8.6–10.2)
CHLORIDE SERPL-SCNC: 99 MMOL/L (ref 96–106)
CHOLEST SERPL-MCNC: 180 MG/DL (ref 100–199)
CO2 SERPL-SCNC: 22 MMOL/L (ref 20–29)
CREAT SERPL-MCNC: 0.6 MG/DL (ref 0.76–1.27)
EOSINOPHIL # BLD AUTO: 0.6 X10E3/UL (ref 0–0.4)
EOSINOPHIL NFR BLD AUTO: 12 %
ERYTHROCYTE [DISTWIDTH] IN BLOOD BY AUTOMATED COUNT: 14.6 % (ref 12.3–15.4)
GLOBULIN SER CALC-MCNC: 2.9 G/DL (ref 1.5–4.5)
GLUCOSE SERPL-MCNC: 91 MG/DL (ref 65–99)
HCT VFR BLD AUTO: 43 % (ref 37.5–51)
HDLC SERPL-MCNC: 119 MG/DL
HGB BLD-MCNC: 14.7 G/DL (ref 13–17.7)
IMM GRANULOCYTES # BLD AUTO: 0 X10E3/UL (ref 0–0.1)
IMM GRANULOCYTES NFR BLD AUTO: 0 %
INTERPRETATION, 910389: NORMAL
LDLC SERPL CALC-MCNC: 45 MG/DL (ref 0–99)
LYMPHOCYTES # BLD AUTO: 1.2 X10E3/UL (ref 0.7–3.1)
LYMPHOCYTES NFR BLD AUTO: 27 %
MCH RBC QN AUTO: 32.5 PG (ref 26.6–33)
MCHC RBC AUTO-ENTMCNC: 34.2 G/DL (ref 31.5–35.7)
MCV RBC AUTO: 95 FL (ref 79–97)
MONOCYTES # BLD AUTO: 1 X10E3/UL (ref 0.1–0.9)
MONOCYTES NFR BLD AUTO: 22 %
MORPHOLOGY BLD-IMP: ABNORMAL
NEUTROPHILS # BLD AUTO: 1.8 X10E3/UL (ref 1.4–7)
NEUTROPHILS NFR BLD AUTO: 39 %
PLATELET # BLD AUTO: 228 X10E3/UL (ref 150–450)
POTASSIUM SERPL-SCNC: 4 MMOL/L (ref 3.5–5.2)
PROT SERPL-MCNC: 7.3 G/DL (ref 6–8.5)
RBC # BLD AUTO: 4.53 X10E6/UL (ref 4.14–5.8)
SODIUM SERPL-SCNC: 140 MMOL/L (ref 134–144)
TRIGL SERPL-MCNC: 78 MG/DL (ref 0–149)
VLDLC SERPL CALC-MCNC: 16 MG/DL (ref 5–40)
WBC # BLD AUTO: 4.6 X10E3/UL (ref 3.4–10.8)

## 2019-06-21 ENCOUNTER — TELEPHONE (OUTPATIENT)
Dept: INTERNAL MEDICINE CLINIC | Age: 64
End: 2019-06-21

## 2019-06-22 NOTE — PROGRESS NOTES
Send normal/stable results letter. Your results are normal/stable. If not signed up, consider getting my chart to get your results on-line. We can help you to sign up. Very slightly elevated liver enzyme which we can talk about when you follow up.

## 2019-06-25 ENCOUNTER — DOCUMENTATION ONLY (OUTPATIENT)
Dept: INTERNAL MEDICINE CLINIC | Age: 64
End: 2019-06-25

## 2019-06-25 LAB — HEMOCCULT STL QL IA: NEGATIVE

## 2019-07-10 ENCOUNTER — OFFICE VISIT (OUTPATIENT)
Dept: INTERNAL MEDICINE CLINIC | Age: 64
End: 2019-07-10

## 2019-07-10 VITALS
HEART RATE: 111 BPM | SYSTOLIC BLOOD PRESSURE: 121 MMHG | OXYGEN SATURATION: 97 % | RESPIRATION RATE: 16 BRPM | WEIGHT: 120.6 LBS | BODY MASS INDEX: 19.38 KG/M2 | HEIGHT: 66 IN | DIASTOLIC BLOOD PRESSURE: 81 MMHG | TEMPERATURE: 97.7 F

## 2019-07-10 DIAGNOSIS — R20.2 NUMBNESS AND TINGLING OF RIGHT ARM: Primary | ICD-10-CM

## 2019-07-10 DIAGNOSIS — R20.0 NUMBNESS AND TINGLING OF RIGHT ARM: Primary | ICD-10-CM

## 2019-07-10 NOTE — PATIENT INSTRUCTIONS

## 2019-07-10 NOTE — PROGRESS NOTES
Chief Complaint   Patient presents with    Tingling     RIGHT ARM X 3-4 DAYS. DENIES INJURY. 1. Have you been to the ER, urgent care clinic since your last visit? Hospitalized since your last visit? No    2. Have you seen or consulted any other health care providers outside of the [address] [place] [address] since your last visit? Include any pap smears or colon screening.  No     Health Maintenance Due   Topic Date Due    DTaP/Tdap/Td series (1 - Tdap) 05/22/1978    Shingrix Vaccine Age 50> (1 of 2) 05/22/2007

## 2019-07-10 NOTE — PROGRESS NOTES
HISTORY OF PRESENT ILLNESS  Dinesh Lubin is a 60 y.o. male. Intermittent right arm tingling started 3 weeks ago. Got worse in last 3-4 days. Denied injury or pain to right arm. Was just sitting in a chair when it first occurred. Stated he does use his right arm a lot for yard work. Denied a hx of MVA. No history of neck pain or herniated disc in neck. Hx chronic cough productive for white phlegm. No SOB or GOOD. No cp, palpitations. Smokes 1 pack every 3-4 days. Hx Thoracotomy 6/14 for lung mass. Recent CXR at [facility].   Has an appointment with ENT Ziggy Lechuga on August 12th, 2019. Tingling   The history is provided by the patient. This is a new problem. The current episode started more than 1 week ago (3 weeks ago). The problem occurs daily (intermittent). The problem has been gradually worsening. Pertinent negatives include no chest pain, no abdominal pain, no headaches and no shortness of breath. Exacerbated by: when dependent. Nothing relieves the symptoms. He has tried nothing for the symptoms.    No Known Allergies   Patient Active Problem List   Diagnosis Code    Closed fracture of greater trochanter of femur ([facility] S72.113A    Periprosthetic osteolysis of internal prosthetic joint ([facility]) T84.059A    Essential hypertension I10    Numbness and tingling of right arm R20.0, R20.2     Social History     Socioeconomic History    Marital status:      Spouse name: Not on file    Number of children: 3    Years of education: Not on file    Highest education level: Not on file   Occupational History    Occupation: disability     Employer: NOT EMPLOYED   Social Needs    Financial resource strain: Not on file    Food insecurity:     Worry: Not on file     Inability: Not on file    Transportation needs:     Medical: Not on file     Non-medical: Not on file   Tobacco Use    Smoking status: Current Some Day Smoker     Packs/day: 0.50     Years: 45.00     Pack years: 22.50 Types: Cigarettes     Start date: 1974     Last attempt to quit: 10/26/2018     Years since quittin.7    Smokeless tobacco: Never Used   Substance and Sexual Activity    Alcohol use: Yes     Alcohol/week: 13.8 oz     Types: 21 Standard drinks or equivalent, 2 Cans of beer per week     Comment: rarely    Drug use: Yes     Frequency: 1.0 times per week     Types: Cocaine     Comment: pt states that was years ago    Sexual activity: Yes     Partners: Female   Lifestyle    Physical activity:     Days per week: Not on file     Minutes per session: Not on file    Stress: Not on file   Relationships    Social connections:     Talks on phone: Not on file     Gets together: Not on file     Attends [employer] service: Not on file     Active member of club or organization: Not on file     Attends meetings of clubs or organizations: Not on file     Relationship status: Not on file    Intimate partner violence:     Fear of current or ex partner: Not on file     Emotionally abused: Not on file     Physically abused: Not on file     Forced sexual activity: Not on file   Other Topics Concern    Not on file   Social History Narrative    Friend stays with him     Current Outpatient Medications on File Prior to Visit   Medication Sig Dispense Refill    hydroCHLOROthiazide (HYDRODIURIL) 25 mg tablet TAKE ONE TABLET BY MOUTH DAILY 90 Tab 0    aspirin 81 mg chewable tablet Take 1 Tab by mouth daily. 90 Tab 3     No current facility-administered medications on file prior to visit. Review of Systems   Constitutional: Negative for chills, diaphoresis, fever, malaise/fatigue and weight loss. HENT: Negative for congestion, ear pain, sinus pain and sore throat. Eyes: Negative for blurred vision. Respiratory: Positive for cough (white phlegm all day off/ on) and sputum production (amount varies). Negative for hemoptysis, shortness of breath and wheezing.     Cardiovascular: Negative for chest pain, palpitations and leg swelling. Gastrointestinal: Negative for abdominal pain, blood in stool, constipation, diarrhea, heartburn, nausea and vomiting. Genitourinary: Negative for dysuria, flank pain, frequency, hematuria and urgency. Musculoskeletal: Negative for back pain, joint pain, myalgias and neck pain. Skin: Negative for rash. Neurological: Positive for tingling (right arm) and sensory change (numbness right hand intermittently). Negative for dizziness, seizures, loss of consciousness, weakness and headaches. Endo/Heme/Allergies: Negative for environmental allergies. Does not bruise/bleed easily. Psychiatric/Behavioral: Negative for depression and suicidal ideas. The patient is not nervous/anxious. /81 (BP 1 Location: Left arm, BP Patient Position: Sitting)   Pulse (!) 111   Temp 97.7 °F (36.5 °C) (Oral)   Resp 16   Ht 5' 6\" (1.676 m)   Wt 120 lb 9.6 oz (54.7 kg)   SpO2 97%   BMI 19.47 kg/m²   Physical Exam   Constitutional: He is oriented to person, place, and time. He appears well-developed and well-nourished. HENT:   Head: Normocephalic and atraumatic. Right Ear: External ear normal.   Left Ear: External ear normal.   Nose: Nose normal.   Mouth/Throat: No oropharyngeal exudate. Bluish purplish pigmentation oral mucosa left buccal region   Eyes: Pupils are equal, round, and reactive to light. EOM are normal. Right eye exhibits no discharge. Left eye exhibits no discharge. Scleral icterus is present. Neck: Normal range of motion. Neck supple. No tracheal deviation present. No thyromegaly present. Cardiovascular: Normal rate, regular rhythm and normal heart sounds. Exam reveals no gallop and no friction rub. No murmur heard. Pulmonary/Chest: Effort normal and breath sounds normal. No respiratory distress. He has no wheezes. He has no rales. coarse   Abdominal: Soft. Bowel sounds are normal. He exhibits no distension and no mass. There is no tenderness. Pt is thin.  Palpated liver and felt slightly enlarged   Musculoskeletal: Normal range of motion. He exhibits no edema. Negative:   Apprehension test  Empty Can Test  Cross body adduction test  Neer Impingement sign  Georgeana Mc Sign   Lymphadenopathy:     He has no cervical adenopathy. Neurological: He is alert and oriented to person, place, and time. He has normal strength and normal reflexes. No cranial nerve deficit or sensory deficit. Reflex Scores:       Tricep reflexes are 2+ on the right side and 2+ on the left side. Bicep reflexes are 2+ on the right side and 2+ on the left side. Brachioradialis reflexes are 2+ on the right side and 2+ on the left side. Patellar reflexes are 2+ on the right side and 2+ on the left side. Skin: Skin is warm and dry. Psychiatric: He has a normal mood and affect. His behavior is normal. Judgment and thought content normal.     ASSESSMENT and PLAN    ICD-10-CM ICD-9-CM    1. Numbness and tingling of right arm R20.0 782.0 REFERRAL TO PHYSICAL THERAPY    R20.2       Encounter Diagnoses   Name Primary?  Numbness and tingling of right arm Yes     Orders Placed This Encounter    REFERRAL TO PHYSICAL THERAPY    1. Numbness and tingling of right arm  Advised to alternate ice and heat for right arm. May alternate Tylenol and Alleve q 6hrs for any discomfort. Has PT appt at [facility] July 16th. May need imaging of neck if tingling continues  - REFERRAL TO PHYSICAL THERAPY  2. Chronic Cough  Has an ENT appt with DR. Fink August 12th, 2019. Follow-up and Dispositions    · Return in about 2 weeks (around 7/24/2019), or if symptoms worsen or fail to improve.      Linda Soriano NP

## 2019-07-18 ENCOUNTER — DOCUMENTATION ONLY (OUTPATIENT)
Dept: INTERNAL MEDICINE CLINIC | Age: 64
End: 2019-07-18

## 2019-07-18 NOTE — PROGRESS NOTES
Signed cervical evaluation form faxed to [facility] PT and confirmation page received.

## 2019-12-27 NOTE — PERIOP NOTES
[facility]  Preoperative Instructions        Surgery Date 12/31/19          Time of Arrival 0715    1. On the day of your surgery, please report to the Surgical Services Registration Desk and sign in at your designated time. The Surgery Center is located to the right of the Emergency Room. 2. You must have someone with you to drive you home. You should not drive a car for 24 hours following surgery. Please make arrangements for a friend or family member to stay with you for the first 24 hours after your surgery. 3. Do not have anything to eat or drink (including water, gum, mints, coffee, juice) after midnight 12/30/19?? Edy Almaguer ? This may not apply to medications prescribed by your physician. ?(Please note below the special instructions with medications to take the morning of your procedure.)    4. We recommend you do not drink any alcoholic beverages for 24 hours before and after your surgery. 5. Contact your surgeons office for instructions on the following medications: non-steroidal anti-inflammatory drugs (i.e. Advil, Aleve), vitamins, and supplements. (Some surgeons will want you to stop these medications prior to surgery and others may allow you to take them)  **If you are currently taking Plavix, Coumadin, Aspirin and/or other blood-thinning agents, contact your surgeon for instructions. ** Your surgeon will partner with the physician prescribing these medications to determine if it is safe to stop or if you need to continue taking. Please do not stop taking these medications without instructions from your surgeon    6. Wear comfortable clothes. Wear glasses instead of contacts. Do not bring any money or jewelry. Please bring picture ID, insurance card, and any prearranged co-payment or hospital payment. Do not wear make-up, particularly mascara the morning of your surgery. Do not wear nail polish, particularly if you are having foot /hand surgery.   Wear your hair loose or down, no ponytails, buns, elisabet pins or clips. All body piercings must be removed. Please shower with antibacterial soap for three consecutive days before and on the morning of surgery, but do not apply any lotions, powders or deodorants after the shower on the day of surgery. Please use a fresh towels after each shower. Please sleep in clean clothes and change bed linens the night before surgery. Please do not shave for 48 hours prior to surgery. Shaving of the face is acceptable. 7. You should understand that if you do not follow these instructions your surgery may be cancelled. If your physical condition changes (I.e. fever, cold or flu) please contact your surgeon as soon as possible. 8. It is important that you be on time. If a situation occurs where you may be late, please call [phone] (OR Holding Area). 9. If you have any questions and or problems, please call [phone] (Pre-admission Testing). 10. Your surgery time may be subject to change. You will receive a phone call the evening prior if your time changes. 11.  If having outpatient surgery, you must have someone to drive you here, stay with you during the duration of your stay, and to drive you home at time of discharge. 12.   In an effort to improve the efficiency, privacy, and safety for all of our Pre-op patients visitors are not allowed in the Holding area. Once you arrive and are registered your family/visitors will be asked to remain in the waiting room. The Pre-op staff will get you from the Surgical Waiting Area and will explain to you and your family/visitors that the Pre-op phase is beginning. The staff will answer any questions and provide instructions for tracking of the patient, by use of the existing tracking number and color-coded status board in the waiting room.   At this time the staff will also ask for your designated spokesperson information in the event that the physician or staff need to provide an update or obtain any pertinent information. The designated spokesperson will be notified if the physician needs to speak to family during the pre-operative phase. If at any time your family/visitors has questions or concerns they may approach the volunteer desk in the waiting area for assistance. Special Instructions:    MEDICATIONS TO TAKE THE MORNING OF SURGERY WITH A SIP OF WATER: hydrochlorothiazide      I understand a pre-operative phone call will be made to verify my surgery time. In the event that I am not available, I give permission for a message to be left on my answering service and/or with another person?   Yes [phone]         ___________________      __________   _________    (Signature of Patient)             (Witness)                (Date and Time)

## 2019-12-27 NOTE — PERIOP NOTES
Attempted to contact pt and emergency contact without success. pts cell unable to LM and emergency contact had a full mailbox. Will call Dr lopez' office to make aware.

## 2019-12-30 ENCOUNTER — HOSPITAL ENCOUNTER (OUTPATIENT)
Dept: PREADMISSION TESTING | Age: 64
Discharge: HOME | End: 2019-12-30
Attending: OTOLARYNGOLOGY
Payer: MEDICARE

## 2019-12-30 ENCOUNTER — ANESTHESIA EVENT (OUTPATIENT)
Dept: SURGERY | Age: 64
End: 2019-12-30
Payer: MEDICARE

## 2019-12-30 VITALS
SYSTOLIC BLOOD PRESSURE: 135 MMHG | DIASTOLIC BLOOD PRESSURE: 84 MMHG | BODY MASS INDEX: 19.73 KG/M2 | TEMPERATURE: 98.4 F | OXYGEN SATURATION: 98 % | RESPIRATION RATE: 18 BRPM | WEIGHT: 122.8 LBS | HEART RATE: 80 BPM | HEIGHT: 66 IN

## 2019-12-30 LAB
ANION GAP SERPL CALC-SCNC: 5 MMOL/L (ref 5–15)
ATRIAL RATE: 81 BPM
BUN SERPL-MCNC: 12 MG/DL (ref 6–20)
BUN/CREAT SERPL: 13 (ref 12–20)
CALCIUM SERPL-MCNC: 8.8 MG/DL (ref 8.5–10.1)
CALCULATED P AXIS, ECG09: 76 DEGREES
CALCULATED R AXIS, ECG10: 62 DEGREES
CALCULATED T AXIS, ECG11: 54 DEGREES
CHLORIDE SERPL-SCNC: 105 MMOL/L (ref 97–108)
CO2 SERPL-SCNC: 30 MMOL/L (ref 21–32)
CREAT SERPL-MCNC: 0.94 MG/DL (ref 0.7–1.3)
DIAGNOSIS, 93000: NORMAL
ERYTHROCYTE [DISTWIDTH] IN BLOOD BY AUTOMATED COUNT: 14 % (ref 11.5–14.5)
GLUCOSE SERPL-MCNC: 142 MG/DL (ref 65–100)
HCT VFR BLD AUTO: 45.1 % (ref 36.6–50.3)
HGB BLD-MCNC: 14.9 G/DL (ref 12.1–17)
MCH RBC QN AUTO: 32.1 PG (ref 26–34)
MCHC RBC AUTO-ENTMCNC: 33 G/DL (ref 30–36.5)
MCV RBC AUTO: 97.2 FL (ref 80–99)
NRBC # BLD: 0 K/UL (ref 0–0.01)
NRBC BLD-RTO: 0 PER 100 WBC
P-R INTERVAL, ECG05: 140 MS
PLATELET # BLD AUTO: 269 K/UL (ref 150–400)
PMV BLD AUTO: 10.6 FL (ref 8.9–12.9)
POTASSIUM SERPL-SCNC: 3.4 MMOL/L (ref 3.5–5.1)
Q-T INTERVAL, ECG07: 382 MS
QRS DURATION, ECG06: 80 MS
QTC CALCULATION (BEZET), ECG08: 443 MS
RBC # BLD AUTO: 4.64 M/UL (ref 4.1–5.7)
SODIUM SERPL-SCNC: 140 MMOL/L (ref 136–145)
VENTRICULAR RATE, ECG03: 81 BPM
WBC # BLD AUTO: 4.3 K/UL (ref 4.1–11.1)

## 2019-12-30 PROCEDURE — 85027 COMPLETE CBC AUTOMATED: CPT

## 2019-12-30 PROCEDURE — 93005 ELECTROCARDIOGRAM TRACING: CPT

## 2019-12-30 PROCEDURE — 36415 COLL VENOUS BLD VENIPUNCTURE: CPT

## 2019-12-30 PROCEDURE — 80048 BASIC METABOLIC PNL TOTAL CA: CPT

## 2019-12-30 RX ORDER — TRAMADOL HYDROCHLORIDE 50 MG/1
50 TABLET ORAL
COMMUNITY
End: 2020-02-20 | Stop reason: ALTCHOICE

## 2019-12-30 RX ORDER — MELOXICAM 7.5 MG/1
7.5 TABLET ORAL DAILY
COMMUNITY
End: 2020-02-20 | Stop reason: ALTCHOICE

## 2019-12-31 ENCOUNTER — HOSPITAL ENCOUNTER (OUTPATIENT)
Age: 64
Setting detail: OUTPATIENT SURGERY
Discharge: HOME | End: 2019-12-31
Attending: OTOLARYNGOLOGY | Admitting: OTOLARYNGOLOGY
Payer: MEDICARE

## 2019-12-31 ENCOUNTER — ANESTHESIA (OUTPATIENT)
Dept: SURGERY | Age: 64
End: 2019-12-31
Payer: MEDICARE

## 2019-12-31 VITALS
WEIGHT: 125 LBS | TEMPERATURE: 98 F | SYSTOLIC BLOOD PRESSURE: 147 MMHG | BODY MASS INDEX: 20.09 KG/M2 | HEIGHT: 66 IN | RESPIRATION RATE: 18 BRPM | OXYGEN SATURATION: 98 % | DIASTOLIC BLOOD PRESSURE: 84 MMHG | HEART RATE: 65 BPM

## 2019-12-31 PROCEDURE — 74011000250 HC RX REV CODE- 250: Performed by: NURSE ANESTHETIST, CERTIFIED REGISTERED

## 2019-12-31 PROCEDURE — 88305 TISSUE EXAM BY PATHOLOGIST: CPT

## 2019-12-31 PROCEDURE — 74011250637 HC RX REV CODE- 250/637: Performed by: OTOLARYNGOLOGY

## 2019-12-31 PROCEDURE — 76210000021 HC REC RM PH II 0.5 TO 1 HR: Performed by: OTOLARYNGOLOGY

## 2019-12-31 PROCEDURE — 76060000032 HC ANESTHESIA 0.5 TO 1 HR: Performed by: OTOLARYNGOLOGY

## 2019-12-31 PROCEDURE — 77030008684 HC TU ET CUF COVD -B: Performed by: ANESTHESIOLOGY

## 2019-12-31 PROCEDURE — 76210000016 HC OR PH I REC 1 TO 1.5 HR: Performed by: OTOLARYNGOLOGY

## 2019-12-31 PROCEDURE — 77030040922 HC BLNKT HYPOTHRM STRY -A

## 2019-12-31 PROCEDURE — 74011250636 HC RX REV CODE- 250/636: Performed by: NURSE ANESTHETIST, CERTIFIED REGISTERED

## 2019-12-31 PROCEDURE — 77030026438 HC STYL ET INTUB CARD -A: Performed by: ANESTHESIOLOGY

## 2019-12-31 PROCEDURE — 76010000138 HC OR TIME 0.5 TO 1 HR: Performed by: OTOLARYNGOLOGY

## 2019-12-31 PROCEDURE — 77030021678 HC GLIDESCP STAT DISP VERT -B: Performed by: ANESTHESIOLOGY

## 2019-12-31 PROCEDURE — 77030018836 HC SOL IRR NACL ICUM -A: Performed by: OTOLARYNGOLOGY

## 2019-12-31 PROCEDURE — 74011250636 HC RX REV CODE- 250/636: Performed by: ANESTHESIOLOGY

## 2019-12-31 RX ORDER — SODIUM CHLORIDE 0.9 % (FLUSH) 0.9 %
5-40 SYRINGE (ML) INJECTION AS NEEDED
Status: DISCONTINUED | OUTPATIENT
Start: 2019-12-31 | End: 2019-12-31 | Stop reason: HOSPADM

## 2019-12-31 RX ORDER — SODIUM CHLORIDE, SODIUM LACTATE, POTASSIUM CHLORIDE, CALCIUM CHLORIDE 600; 310; 30; 20 MG/100ML; MG/100ML; MG/100ML; MG/100ML
25 INJECTION, SOLUTION INTRAVENOUS CONTINUOUS
Status: DISCONTINUED | OUTPATIENT
Start: 2019-12-31 | End: 2019-12-31 | Stop reason: HOSPADM

## 2019-12-31 RX ORDER — GLYCOPYRROLATE 0.2 MG/ML
INJECTION INTRAMUSCULAR; INTRAVENOUS AS NEEDED
Status: DISCONTINUED | OUTPATIENT
Start: 2019-12-31 | End: 2019-12-31 | Stop reason: HOSPADM

## 2019-12-31 RX ORDER — OXYMETAZOLINE HCL 0.05 %
SPRAY, NON-AEROSOL (ML) NASAL AS NEEDED
Status: DISCONTINUED | OUTPATIENT
Start: 2019-12-31 | End: 2019-12-31 | Stop reason: HOSPADM

## 2019-12-31 RX ORDER — NEOSTIGMINE METHYLSULFATE 1 MG/ML
INJECTION INTRAVENOUS AS NEEDED
Status: DISCONTINUED | OUTPATIENT
Start: 2019-12-31 | End: 2019-12-31 | Stop reason: HOSPADM

## 2019-12-31 RX ORDER — DIPHENHYDRAMINE HYDROCHLORIDE 50 MG/ML
12.5 INJECTION, SOLUTION INTRAMUSCULAR; INTRAVENOUS AS NEEDED
Status: DISCONTINUED | OUTPATIENT
Start: 2019-12-31 | End: 2019-12-31 | Stop reason: HOSPADM

## 2019-12-31 RX ORDER — LIDOCAINE HYDROCHLORIDE 20 MG/ML
INJECTION, SOLUTION EPIDURAL; INFILTRATION; INTRACAUDAL; PERINEURAL AS NEEDED
Status: DISCONTINUED | OUTPATIENT
Start: 2019-12-31 | End: 2019-12-31 | Stop reason: HOSPADM

## 2019-12-31 RX ORDER — SODIUM CHLORIDE 0.9 % (FLUSH) 0.9 %
5-40 SYRINGE (ML) INJECTION EVERY 8 HOURS
Status: DISCONTINUED | OUTPATIENT
Start: 2019-12-31 | End: 2019-12-31 | Stop reason: HOSPADM

## 2019-12-31 RX ORDER — PROPOFOL 10 MG/ML
INJECTION, EMULSION INTRAVENOUS AS NEEDED
Status: DISCONTINUED | OUTPATIENT
Start: 2019-12-31 | End: 2019-12-31 | Stop reason: HOSPADM

## 2019-12-31 RX ORDER — MIDAZOLAM HYDROCHLORIDE 1 MG/ML
INJECTION, SOLUTION INTRAMUSCULAR; INTRAVENOUS AS NEEDED
Status: DISCONTINUED | OUTPATIENT
Start: 2019-12-31 | End: 2019-12-31 | Stop reason: HOSPADM

## 2019-12-31 RX ORDER — FENTANYL CITRATE 50 UG/ML
INJECTION, SOLUTION INTRAMUSCULAR; INTRAVENOUS AS NEEDED
Status: DISCONTINUED | OUTPATIENT
Start: 2019-12-31 | End: 2019-12-31 | Stop reason: HOSPADM

## 2019-12-31 RX ORDER — ROCURONIUM BROMIDE 10 MG/ML
INJECTION, SOLUTION INTRAVENOUS AS NEEDED
Status: DISCONTINUED | OUTPATIENT
Start: 2019-12-31 | End: 2019-12-31 | Stop reason: HOSPADM

## 2019-12-31 RX ORDER — FENTANYL CITRATE 50 UG/ML
25 INJECTION, SOLUTION INTRAMUSCULAR; INTRAVENOUS
Status: DISCONTINUED | OUTPATIENT
Start: 2019-12-31 | End: 2019-12-31 | Stop reason: HOSPADM

## 2019-12-31 RX ORDER — ONDANSETRON 2 MG/ML
INJECTION INTRAMUSCULAR; INTRAVENOUS AS NEEDED
Status: DISCONTINUED | OUTPATIENT
Start: 2019-12-31 | End: 2019-12-31 | Stop reason: HOSPADM

## 2019-12-31 RX ORDER — SUCCINYLCHOLINE CHLORIDE 20 MG/ML
INJECTION INTRAMUSCULAR; INTRAVENOUS AS NEEDED
Status: DISCONTINUED | OUTPATIENT
Start: 2019-12-31 | End: 2019-12-31 | Stop reason: HOSPADM

## 2019-12-31 RX ORDER — PHENYLEPHRINE HCL IN 0.9% NACL 0.4MG/10ML
SYRINGE (ML) INTRAVENOUS AS NEEDED
Status: DISCONTINUED | OUTPATIENT
Start: 2019-12-31 | End: 2019-12-31 | Stop reason: HOSPADM

## 2019-12-31 RX ORDER — HYDROMORPHONE HYDROCHLORIDE 1 MG/ML
0.2 INJECTION, SOLUTION INTRAMUSCULAR; INTRAVENOUS; SUBCUTANEOUS
Status: DISCONTINUED | OUTPATIENT
Start: 2019-12-31 | End: 2019-12-31 | Stop reason: HOSPADM

## 2019-12-31 RX ORDER — LIDOCAINE HYDROCHLORIDE 10 MG/ML
0.1 INJECTION, SOLUTION EPIDURAL; INFILTRATION; INTRACAUDAL; PERINEURAL AS NEEDED
Status: DISCONTINUED | OUTPATIENT
Start: 2019-12-31 | End: 2019-12-31 | Stop reason: HOSPADM

## 2019-12-31 RX ORDER — DEXAMETHASONE SODIUM PHOSPHATE 4 MG/ML
INJECTION, SOLUTION INTRA-ARTICULAR; INTRALESIONAL; INTRAMUSCULAR; INTRAVENOUS; SOFT TISSUE AS NEEDED
Status: DISCONTINUED | OUTPATIENT
Start: 2019-12-31 | End: 2019-12-31 | Stop reason: HOSPADM

## 2019-12-31 RX ADMIN — ROCURONIUM BROMIDE 10 MG: 10 INJECTION INTRAVENOUS at 09:14

## 2019-12-31 RX ADMIN — NEOSTIGMINE METHYLSULFATE 2 MG: 1 INJECTION INTRAVENOUS at 09:37

## 2019-12-31 RX ADMIN — PROPOFOL 50 MG: 10 INJECTION, EMULSION INTRAVENOUS at 09:26

## 2019-12-31 RX ADMIN — GLYCOPYRROLATE 0.2 MG: 0.2 INJECTION, SOLUTION INTRAMUSCULAR; INTRAVENOUS at 09:37

## 2019-12-31 RX ADMIN — ROCURONIUM BROMIDE 10 MG: 10 INJECTION INTRAVENOUS at 09:26

## 2019-12-31 RX ADMIN — SODIUM CHLORIDE, SODIUM LACTATE, POTASSIUM CHLORIDE, AND CALCIUM CHLORIDE 25 ML/HR: 600; 310; 30; 20 INJECTION, SOLUTION INTRAVENOUS at 08:04

## 2019-12-31 RX ADMIN — Medication 40 MCG: at 09:32

## 2019-12-31 RX ADMIN — SUCCINYLCHOLINE CHLORIDE 120 MG: 20 INJECTION, SOLUTION INTRAMUSCULAR; INTRAVENOUS at 09:14

## 2019-12-31 RX ADMIN — FENTANYL CITRATE 25 MCG: 50 INJECTION, SOLUTION INTRAMUSCULAR; INTRAVENOUS at 09:26

## 2019-12-31 RX ADMIN — ONDANSETRON HYDROCHLORIDE 4 MG: 2 INJECTION, SOLUTION INTRAMUSCULAR; INTRAVENOUS at 09:19

## 2019-12-31 RX ADMIN — FENTANYL CITRATE 25 MCG: 50 INJECTION, SOLUTION INTRAMUSCULAR; INTRAVENOUS at 09:17

## 2019-12-31 RX ADMIN — DEXAMETHASONE SODIUM PHOSPHATE 10 MG: 4 INJECTION, SOLUTION INTRAMUSCULAR; INTRAVENOUS at 09:19

## 2019-12-31 RX ADMIN — FENTANYL CITRATE 25 MCG: 50 INJECTION, SOLUTION INTRAMUSCULAR; INTRAVENOUS at 09:14

## 2019-12-31 RX ADMIN — LIDOCAINE HYDROCHLORIDE 60 MG: 20 INJECTION, SOLUTION EPIDURAL; INFILTRATION; INTRACAUDAL; PERINEURAL at 09:14

## 2019-12-31 RX ADMIN — PROPOFOL 150 MG: 10 INJECTION, EMULSION INTRAVENOUS at 09:14

## 2019-12-31 RX ADMIN — MIDAZOLAM HYDROCHLORIDE 2 MG: 1 INJECTION INTRAMUSCULAR; INTRAVENOUS at 09:06

## 2019-12-31 NOTE — PERIOP NOTES
[facility] from Operating Room to PACU    Report received from [address] [place], [place] [address] St and Samantha Gomez CRNA regarding Dali Perdomo. Surgeon(s):  Will Ayala MD  And Procedure(s) (LRB):  TELESCOPIC DIRECT LARYNGOSCOPY WITH BIOPSY (N/A)  confirmed   with allergies discussed. Anesthesia type, drugs, patient history, complications, estimated blood loss, vital signs, intake and output, and last pain medication, lines, reversal medications and temperature were reviewed. 1011 Family updated. 1100 TRANSFER - OUT REPORT:    Verbal report given to [address] [place] [address] RN(name) on Dali Perdomo  being transferred to Phase II (unit) for routine post - op       Report consisted of patients Situation, Background, Assessment and   Recommendations(SBAR). Information from the following report(s) SBAR, Kardex, OR Summary, Procedure Summary, Intake/Output and MAR was reviewed with the receiving nurse. Opportunity for questions and clarification was provided. Patient transported with:   Registered Nurse and patient belongings.

## 2019-12-31 NOTE — PROGRESS NOTES
Patient discharged to home. Iv removed. Discharge instructions, scripts, and medication education done with patient and friend.

## 2019-12-31 NOTE — ANESTHESIA PREPROCEDURE EVALUATION
Relevant Problems   No relevant active problems       Anesthetic History   No history of anesthetic complications            Review of Systems / Medical History  Patient summary reviewed, nursing notes reviewed and pertinent labs reviewed    Pulmonary    COPD      Smoker      Comments: H/o Thoracotomy for lung mass 2014  Smokes 1/2 ppd  Chronic cough  Left true Vocal cord abnormality   Neuro/Psych   Within defined limits           Cardiovascular    Hypertension              Exercise tolerance: >4 METS  Comments: 12/19 EKG= SR, LAE   GI/Hepatic/Renal  Within defined limits              Endo/Other  Within defined limits           Other Findings   Comments: ETOH abuse (27drinks/week)    H/o cocaine abuse; none x 1-2 months         Physical Exam    Airway  Mallampati: II  TM Distance: 4 - 6 cm  Neck ROM: normal range of motion   Mouth opening: Normal     Cardiovascular    Rhythm: regular  Rate: normal         Dental    Dentition: Lower partial plate     Pulmonary  Breath sounds clear to auscultation               Abdominal  GI exam deferred       Other Findings            Anesthetic Plan    ASA: 2  Anesthesia type: general          Induction: Intravenous  Anesthetic plan and risks discussed with: Patient

## 2019-12-31 NOTE — OP NOTES
[address]  OPERATIVE REPORT    Name:  Bebeto Mcdonnell  MR#:  [id number]  :  1955  ACCOUNT #:  [de-identified]  DATE OF SERVICE:  2019    PREOPERATIVE DIAGNOSIS:  Hoarseness, left true vocal cord abnormality. POSTOPERATIVE DIAGNOSIS:  Hoarseness, left true vocal cord abnormality. PROCEDURE PERFORMED:  Telescopic suspension, direct laryngoscopy with left true vocal cord biopsy. SURGEON:  Jhonatan Hair MD    ASSISTANT:  None. ANESTHESIA:  General endotracheal tube anesthesia. COMPLICATIONS:  None apparent. SPECIMENS REMOVED:  Left true vocal cord. IMPLANTS:  None. ESTIMATED BLOOD LOSS:  0.25 mL. INDICATIONS:  The patient is a 62-year-old male with a long history of hoarseness and on recent fiberoptic examination of the larynx, a region of left vocal cord abnormality was visualized. The patient was advised to undergo microscopic inspection and biopsy of the abnormal-appearing area in order to exclude malignancy. Preoperatively, the alternatives, potential benefits, and possible risks of the procedure were explained to the patient, who understood and requested to proceed. PROCEDURE:  The patient was brought to the operating room, placed supine on the operating table and following the smooth induction of general endotracheal tube anesthesia, the table was turned 90 degrees and the patient was positioned for operation. A maxillary dental guard was applied. The Storz anterior commissure laryngoscope was advanced into the oral cavity and suspended. A survey of the base of tongue, vallecula, piriform sinuses, postcricoid areas, and endolarynx was carried out. With the exception of left true vocal cord abnormality, no additional lesions could be identified.   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  Once the lesion was satisfactorily removed and following application of topical oxymetazoline for hemostasis, the endolarynx was again inspected. As satisfactory hemostasis was observed and no additional mucosal surface lesions identified in the region of the larynx or subglottic region, the procedure was concluded. The larynx was removed from suspension. The laryngoscope was removed from the oral cavity. The maxillary dental guard was likewise removed. The patient underwent arousal from general anesthesia extubation in the operating room and transferred to the recovery area in satisfactory condition.         Saida Orta MD      [id number]  D:  12/31/2019 9:56  T:  12/31/2019 13:12  JOB #:  [id number]  CC:  MD Sarah Rizvi MD

## 2019-12-31 NOTE — DISCHARGE INSTRUCTIONS
VOICE REST INSTRUCTIONS-10 DAYS      1. Avoid shouting, screaming, cheering and excessive loud laughing. 2. Cough and clear your throat only when absolutely necessary, and then do it gently and easily. 3. Do not make strange noises with your voice. 4. Do not strain your voice, or whisper. 5. Avoid talking while using noisy transportation---bus, car motorcycle, etc.  6. Avoid talking while in a noisy environment---machinery, loud music, etc.  7. Do not sing, act in plays or give speeches. 8. Limit talking as much as possible. If you must talk, do so in a comfortable pitch without straining your voice. Again dont whisper. 9. Talk easily, initiate voice smoothly and effortlessly. 10. Clean the filters in your home, including HVAC and humidifying units. 11. Avoid dust, smoke, pollen, chemical smells and other airborne irritants. 12. Do not smoke, and avoid second hand smoke. 13. Stay hydrated, drink 8-10 glasses of water a day. Avoid caffeine and alcohol. 14. Keep the air in your home and office 40% relative humidity. Use a humidifier - doesnt matter warm or cool mist.  TO PREVENT AN INFECTION      1. [address] YOUR HANDS     To prevent infection, good handwashing is the most important thing you or your caregiver can do.  Wash your hands with soap and water or use the hand  we gave you before you touch any wounds. 2. SHOWER     Use the antibacterial soap we gave you when you take a shower.  Shower with this soap until your wounds are healed.  To reach all areas of your body, you may need someone to help you.  Dont forget to clean your belly button with every shower. 3.  USE CLEAN SHEETS     Use freshly cleaned sheets on your bed after surgery.  To keep the surgery site clean, do not allow pets to sleep with you while your wound is still healing. 4. STOP SMOKING     Stop smoking, or at least cut back on smoking     Smoking slows your healing.      5.  CONTROL YOUR BLOOD SUGAR     High blood sugars slow wound healing. If you are diabetic, control your blood sugar levels before and after your surgery. DISCHARGE SUMMARY from Nurse    The following personal items are in your possession at time of discharge:    Dental Appliances: None  Visual Aid: None  Home Medications: None  Jewelry: None  Clothing: None (left in in pt. room)  Other Valuables: None             PATIENT INSTRUCTIONS:    After general anesthesia or intravenous sedation, for 24 hours or while taking prescription Narcotics:  Limit your activities  Do not drive and operate hazardous machinery  Do not make important personal or business decisions  Do  not drink alcoholic beverages  If you have not urinated within 8 hours after discharge, please contact your surgeon on call. Report the following to your surgeon:  Excessive pain, swelling, redness or odor of or around the surgical area  Temperature over 100.5  Nausea and vomiting lasting longer than 4 hours or if unable to take medications  Any signs of decreased circulation or nerve impairment to extremity: change in color, persistent  numbness, tingling, coldness or increase pain  Any questions    To prevent infection remember to refer to your handout on handwashing given to you by your nurse. *  Please give a list of your current medications to your Primary Care Provider. *  Please update this list whenever your medications are discontinued, doses are      changed, or new medications (including over-the-counter products) are added. *  Please carry medication information at all times in case of emergency situations. These are general instructions for a healthy lifestyle:    No smoking/ No tobacco products/ Avoid exposure to second hand smoke    Surgeon General's Warning:  Quitting smoking now greatly reduces serious risk to your health.     Obesity, smoking, and sedentary lifestyle greatly increases your risk for illness    A healthy diet, regular physical exercise & weight monitoring are important for maintaining a healthy lifestyle    You may be retaining fluid if you have a history of heart failure or if you experience any of the following symptoms:  Weight gain of 3 pounds or more overnight or 5 pounds in a week, increased swelling in our hands or feet or shortness of breath while lying flat in bed. Please call your doctor as soon as you notice any of these symptoms; do not wait until your next office visit. Recognize signs and symptoms of STROKE:    F-face looks uneven    A-arms unable to move or move unevenly    S-speech slurred or non-existent    T-time-call 911 as soon as signs and symptoms begin-DO NOT go       Back to bed or wait to see if you get better-TIME IS BRAIN.  The discharge information has been reviewed with the patient. The patient verbalized understanding.

## 2019-12-31 NOTE — ANESTHESIA POSTPROCEDURE EVALUATION
Procedure(s):  TELESCOPIC DIRECT LARYNGOSCOPY WITH BIOPSY. general    Anesthesia Post Evaluation      Multimodal analgesia: multimodal analgesia used between 6 hours prior to anesthesia start to PACU discharge  Patient location during evaluation: PACU  Patient participation: complete - patient participated  Level of consciousness: awake and alert  Pain score: 0  Airway patency: patent  Anesthetic complications: no  Cardiovascular status: acceptable  Respiratory status: acceptable  Hydration status: acceptable  Post anesthesia nausea and vomiting:  none      Vitals Value Taken Time   /72 12/31/2019 10:45 AM   Temp 36.6 °C (97.8 °F) 12/31/2019  9:57 AM   Pulse 67 12/31/2019 10:57 AM   Resp 18 12/31/2019 10:57 AM   SpO2 100 % 12/31/2019 10:33 AM   Vitals shown include unvalidated device data.

## 2019-12-31 NOTE — BRIEF OP NOTE
BRIEF OPERATIVE NOTE    Date of Procedure: 12/31/2019   Preoperative Diagnosis: LEFT TRUE VOCAL CORD ABNORMALITY  Postoperative Diagnosis: LEFT TRUE VOCAL CORD ABNORMALITY    Procedure(s):  TELESCOPIC DIRECT LARYNGOSCOPY WITH BIOPSY  Surgeon(s) and Role:     * Ever Bhatt MD - Primary         Surgical Assistant: None    Surgical Staff:  Circ-1: Maria De Jesus Anderson RN  Scrub Tech-1: Gardenia Donato  Scrub RN-1: Jhoana Goldstein  Event Time In Time Out   Incision Start 0680    Incision Close 0935      Anesthesia: General   Estimated Blood Loss: 0.25 ml  Specimens:   ID Type Source Tests Collected by Time Destination   1 : left true vocal cord Preservative Mouth  Ever Bhatt MD 12/31/2019 3391 Pathology      Findings: As expected.   Complications: None apparent  Implants: * No implants in log *

## 2020-01-30 ENCOUNTER — HOSPITAL ENCOUNTER (OUTPATIENT)
Dept: ULTRASOUND IMAGING | Age: 65
Discharge: HOME | End: 2020-01-30
Attending: OTOLARYNGOLOGY
Payer: MEDICARE

## 2020-01-30 DIAGNOSIS — R59.1 LYMPHADENOPATHY: ICD-10-CM

## 2020-01-30 PROCEDURE — 76536 US EXAM OF HEAD AND NECK: CPT

## 2020-02-20 ENCOUNTER — OFFICE VISIT (OUTPATIENT)
Dept: INTERNAL MEDICINE CLINIC | Age: 65
End: 2020-02-20

## 2020-02-20 VITALS
WEIGHT: 124 LBS | DIASTOLIC BLOOD PRESSURE: 76 MMHG | SYSTOLIC BLOOD PRESSURE: 128 MMHG | HEIGHT: 66 IN | RESPIRATION RATE: 16 BRPM | TEMPERATURE: 96.5 F | HEART RATE: 89 BPM | OXYGEN SATURATION: 96 % | BODY MASS INDEX: 19.93 KG/M2

## 2020-02-20 DIAGNOSIS — Z96.641 HISTORY OF RIGHT HIP REPLACEMENT: ICD-10-CM

## 2020-02-20 DIAGNOSIS — Z87.81 S/P RIGHT HIP FRACTURE: Primary | ICD-10-CM

## 2020-02-20 DIAGNOSIS — Z72.0 TOBACCO ABUSE: ICD-10-CM

## 2020-02-20 DIAGNOSIS — I10 ESSENTIAL HYPERTENSION: ICD-10-CM

## 2020-02-20 RX ORDER — BUPROPION HYDROCHLORIDE 100 MG/1
100 TABLET ORAL 2 TIMES DAILY
Qty: 60 TAB | Refills: 2 | Status: SHIPPED | OUTPATIENT
Start: 2020-02-20 | End: 2021-01-14

## 2020-02-20 RX ORDER — HYDROCHLOROTHIAZIDE 25 MG/1
TABLET ORAL
Qty: 90 TAB | Refills: 3 | Status: SHIPPED | OUTPATIENT
Start: 2020-02-20 | End: 2020-05-15 | Stop reason: SDUPTHER

## 2020-02-20 RX ORDER — OXYCODONE AND ACETAMINOPHEN 5; 325 MG/1; MG/1
1 TABLET ORAL
Qty: 28 TAB | Refills: 0 | Status: SHIPPED | OUTPATIENT
Start: 2020-02-20 | End: 2020-02-27

## 2020-02-20 RX ORDER — OXYCODONE AND ACETAMINOPHEN 5; 325 MG/1; MG/1
TABLET ORAL
COMMUNITY
End: 2020-02-20 | Stop reason: SDUPTHER

## 2020-02-20 NOTE — PROGRESS NOTES
HISTORY OF PRESENT ILLNESS  Tish Helms is a 60 y.o. male. DrWind Appt 2 27 20    Hip Pain    The history is provided by the patient. This is a recurrent problem. The current episode started more than 1 week ago. The problem occurs constantly. The problem has not changed since onset. The pain is present in the right hip. The quality of the pain is described as aching (throbbing). The pain is moderate. Treatments tried: percs. The treatment provided moderate relief. There has been a history of trauma (fell  when knee gave way went to ER where Fx Dx). 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 Respiratory: Negative for shortness of breath. Cardiovascular: Negative for chest pain. Musculoskeletal: Positive for falls and joint pain. Social History     Socioeconomic History    Marital status:      Spouse name: Not on file    Number of children: 3    Years of education: Not on file    Highest education level: Not on file   Occupational History    Occupation: disability     Employer: NOT EMPLOYED   Social Needs    Financial resource strain: Not on file    Food insecurity:     Worry: Not on file     Inability: Not on file    Transportation needs:     Medical: Not on file     Non-medical: Not on file   Tobacco Use    Smoking status: Current Some Day Smoker     Packs/day: 0.50     Years: 45.00     Pack years: 22.50     Types: Cigarettes     Start date: 1974     Last attempt to quit: 10/26/2018     Years since quittin.3    Smokeless tobacco: Never Used   Substance and Sexual Activity    Alcohol use:  Yes     Alcohol/week: 27.0 standard drinks     Types: 6 Cans of beer, 21 Standard drinks or equivalent per week    Drug use: Yes     Frequency: 1.0 times per week     Types: Cocaine     Comment: used a few months ago    Sexual activity: Yes     Partners: Female   Lifestyle    Physical activity:     Days per week: Not on file     Minutes per session: Not on file    Stress: Not on file   Relationships    Social connections:     Talks on phone: Not on file     Gets together: Not on file     Attends Yazdanism service: Not on file     Active member of club or organization: Not on file     Attends meetings of clubs or organizations: Not on file     Relationship status: Not on file    Intimate partner violence:     Fear of current or ex partner: Not on file     Emotionally abused: Not on file     Physically abused: Not on file     Forced sexual activity: Not on file   Other Topics Concern    Not on file   Social History Narrative    Friend stays with him       Physical Exam  Visit Vitals  /76 (BP 1 Location: Left arm, BP Patient Position: Sitting)   Pulse 89   Temp 96.5 °F (35.8 °C) (Temporal)   Resp 16   Ht 5' 6\" (1.676 m)   Wt 124 lb (56.2 kg)   SpO2 96%   BMI 20.01 kg/m²     WD WN male NAD  Heart RRR without murmers clicks or rubs  Lungs CTA  Abdo soft nontender  Ext no edema  Hip grossly unremarkable other than scar previous hip surgery. ASSESSMENT and PLAN  Encounter Diagnoses   Name Primary?  S/P right hip fracture Yes    History of right hip replacement     Essential hypertension     Tobacco abuse      Orders Placed This Encounter    AMB SUPPLY ORDER    METABOLIC PANEL, BASIC    DISCONTD: oxyCODONE-acetaminophen (PERCOCET) 5-325 mg per tablet    oxyCODONE-acetaminophen (PERCOCET) 5-325 mg per tablet    hydroCHLOROthiazide (HYDRODIURIL) 25 mg tablet    buPROPion (WELLBUTRIN) 100 mg tablet     Patient was looked up in the .  There are multiple prescribers of controlled substances  No  Continue Percocet till he gets in to see the surgeon, they can decide what further to do about his fracture. Having some issues with ADLs  Will get nursing into help him. We discussed this pain and the usage of controlled substances for hip Fx relief. In general these medications are indicated for the acute symptom relief and not for chronic usage, allthough they are frequently used for chronic management  After a certain period of time they should be stopped to avoid dependence and/or addiction. I warned him about the dangers of addiction and other side affects including respiratory depression and death. Discussed how this is a controlled substance and that the prescribing of it is should be monitored very carefully. Drug usage is also monitored by our practise and the [facility], since there has been a lot of abuse and diversion of controlled substances. In general we do not refill this medication over the phone. PLease ask for enough pills to get you to your next appointment and make sure you keep your appointments. Warned not to take other medications like alcohol, other benzodiazapines marijuana or other narcotics when on this medication. .The patient was counseled on the dangers of tobacco use, and was advised to quit. Reviewed strategies to maximize success, including pharmacotherapy (Wellbutrin). Follow-up and Dispositions    · Return in about 6 weeks (around 4/2/2020) for routine follow up.

## 2020-02-20 NOTE — PROGRESS NOTES
Chief Complaint   Patient presents with    Hip Pain     R/hip FX pain after fall x2 weeks ago     I have reviewed the patient's medical history in detail and updated the computerized patient record. Health Maintenance reviewed. 1. Have you been to the ER, urgent care clinic since your last visit? Hospitalized since your last visit? yes    2. Have you seen or consulted any other health care providers outside of the [address] [place] [address] since your last visit? Include any pap smears or colon screening. [facility] - ADM      Encouraged pt to discuss pt's wishes with spouse/partner/family and bring them in the next appt to follow thru with the Advanced Directive    Fall Risk Assessment, last 12 mths 2/20/2020   Able to walk? Yes   Fall in past 12 months? Yes   Fall with injury? Yes   Number of falls in past 12 months 2   Fall Risk Score 3       3 most recent PHQ Screens 2/20/2020   Little interest or pleasure in doing things Several days   Feeling down, depressed, irritable, or hopeless Several days   Total Score PHQ 2 2       Abuse Screening Questionnaire 2/20/2020   Do you ever feel afraid of your partner? N   Are you in a relationship with someone who physically or mentally threatens you? N   Is it safe for you to go home?  Y       ADL Assessment 2/20/2020   Feeding yourself No Help Needed   Getting from bed to chair No Help Needed   Getting dressed No Help Needed   Bathing or showering No Help Needed   Walk across the room (includes cane/walker) No Help Needed   Using the telphone No Help Needed   Taking your medications No Help Needed   Preparing meals No Help Needed   Managing money (expenses/bills) No Help Needed   Moderately strenuous housework (laundry) No Help Needed   Shopping for personal items (toiletries/medicines) No Help Needed   Shopping for groceries No Help Needed   Driving Help Needed   Climbing a flight of stairs Help Needed   Getting to places beyond walking distances Help Needed

## 2020-02-20 NOTE — PATIENT INSTRUCTIONS
Potassium-Rich Diet: Care Instructions Your Care Instructions Potassium is a mineral. It helps keep the right mix of fluids in your body. It also helps your nerves and muscles work as they should. You'll find it in milk and meats. It's also in all fresh foods, including fruits and vegetables. Most adults need about 5 grams of potassium a day. The foods you eat should supply all that you need. Some health conditions can cause a loss of potassium. For example, kidney problems and stomach problems with vomiting and diarrhea can cause you to lose this mineral. Some medicines, such as water pills (diuretics), can cause low potassium. If you can't get enough potassium from what you eat, your doctor may advise you to take supplements. Follow-up care is a key part of your treatment and safety. Be sure to make and go to all appointments, and call your doctor if you are having problems. It's also a good idea to know your test results and keep a list of the medicines you take. How can you care for yourself at home? · Plan your diet around foods that are rich in potassium. Fresh, unprocessed whole foods have the most. These foods include: ? Milk and other dairy products. ? Vegetables, especially broccoli, cooked dry beans, tomatoes, potatoes, artichokes, winter squash, and spinach. ? Fruits, especially citrus fruits, bananas, and apricots. Dried apricots contain more potassium than fresh apricots. ? Meat, poultry, and fish. · Ask your doctor about using a salt substitute or \"light\" salt. These often contain potassium. Where can you learn more? Go to http://layne-mary.info/. Enter H315 in the search box to learn more about \"Potassium-Rich Diet: Care Instructions. \" Current as of: November 7, 2018 Content Version: 12.2 © 9063-6615 [employer].  Care instructions adapted under license by [employer] (which disclaims liability or warranty for this information). If you have questions about a medical condition or this instruction, always ask your healthcare professional. [address] any warranty or liability for your use of this information. Deciding About Using Medicines To Quit Smoking How can you decide about using medicines to quit smoking? What are the medicines you can use? Your doctor may prescribe varenicline (Chantix) or bupropion (Zyban). These medicines can help you cope with cravings for tobacco. They are pills that don't contain nicotine. You also can use nicotine replacement products. These do contain nicotine. There are many types. · Gum and lozenges slowly release nicotine into your mouth. · Patches stick to your skin. They slowly release nicotine into your bloodstream. 
· An inhaler has a briggs that contains nicotine. You breathe in a puff of nicotine vapor through your mouth and throat. · Nasal spray releases a mist that contains nicotine. What are key points about this decision? · Using medicines can double your chances of quitting smoking. They can ease cravings and withdrawal symptoms. · Getting counseling along with using medicine can raise your chances of quitting even more. · If you smoke fewer than 5 cigarettes a day, you may not need medicines to help you quit smoking. · These medicines have less nicotine than cigarettes. And by itself, nicotine is not nearly as harmful as smoking. The tars, carbon monoxide, and other toxic chemicals in tobacco cause the harmful effects. · The side effects of nicotine replacement products depend on the type of product. For example, a patch can make your skin red and itchy. Medicines in pill form can make you sick to your stomach. They can also cause dry mouth and trouble sleeping. For most people, the side effects are not bad enough to make them stop using the products. Why might you choose to use medicines to quit smoking? · You have tried on your own to stop smoking, but you were not able to stop. · You smoke more than 5 cigarettes a day. · You want to increase your chances of quitting smoking. · You want to reduce your cravings and withdrawal symptoms. · You feel the benefits of medicine outweigh the side effects. Why might you choose not to use medicine? · You want to try quitting on your own by stopping all at once (\"cold turkey\"). · You want to cut back slowly on the number of cigarettes you smoke. · You smoke fewer than 5 cigarettes a day. · You do not like using medicine. · You feel the side effects of medicines outweigh the benefits. · You are worried about the cost of medicines. Your decision Thinking about the facts and your feelings can help you make a decision that is right for you. Be sure you understand the benefits and risks of your options, and think about what else you need to do before you make the decision. Where can you learn more? Go to http://layne-mary.info/. Enter D009 in the search box to learn more about \"Deciding About Using Medicines To Quit Smoking. \" Current as of: September 26, 2018 Content Version: 12.2 © 1039-8897 [employer]. Care instructions adapted under license by [employer] (which disclaims liability or warranty for this information). If you have questions about a medical condition or this instruction, always ask your healthcare professional. [address] any warranty or liability for your use of this information.

## 2020-02-21 ENCOUNTER — DOCUMENTATION ONLY (OUTPATIENT)
Dept: INTERNAL MEDICINE CLINIC | Age: 65
End: 2020-02-21

## 2020-02-21 NOTE — PROGRESS NOTES
Fax amb orders for Nurse to help him dress and other ADLs since has hip FX to [address] [phone] with confirmation.

## 2020-05-15 ENCOUNTER — VIRTUAL VISIT (OUTPATIENT)
Dept: INTERNAL MEDICINE CLINIC | Age: 65
End: 2020-05-15

## 2020-05-15 DIAGNOSIS — Z72.0 TOBACCO ABUSE: ICD-10-CM

## 2020-05-15 DIAGNOSIS — I10 ESSENTIAL HYPERTENSION: ICD-10-CM

## 2020-05-15 DIAGNOSIS — M16.11 ARTHRITIS OF RIGHT HIP: Primary | ICD-10-CM

## 2020-05-15 RX ORDER — NAPROXEN 500 MG/1
500 TABLET ORAL 2 TIMES DAILY WITH MEALS
Qty: 60 TAB | Refills: 0 | Status: SHIPPED | OUTPATIENT
Start: 2020-05-15

## 2020-05-15 RX ORDER — HYDROCHLOROTHIAZIDE 25 MG/1
TABLET ORAL
Qty: 90 TAB | Refills: 3 | Status: SHIPPED | OUTPATIENT
Start: 2020-05-15 | End: 2020-12-04 | Stop reason: SDUPTHER

## 2020-05-15 NOTE — PROGRESS NOTES
Chief Complaint   Patient presents with    Hip Pain     pain R/hip and knees     Patient has not been out of the country in 45-90 days, NO diarrhea, NO cough, NO chest conjestion, NO temp. Pt has not been around anyone with these symptoms. Health Maintenance reviewed. I have reviewed the patient's medical history in detail and updated the computerized patient record. 1. Have you been to the ER, urgent care clinic since your last visit? Hospitalized since your last visit?no    2. Have you seen or consulted any other health care providers outside of the [address] [place] [address] since your last visit? Include any pap smears or colon screening. No      Encouraged pt to discuss pt's wishes with spouse/partner/family and bring them in the next appt to follow thru with the Advanced Directive    Fall Risk Assessment, last 12 mths 5/15/2020   Able to walk? Yes   Fall in past 12 months? No   Fall with injury? -   Number of falls in past 12 months -   Fall Risk Score -       3 most recent PHQ Screens 5/15/2020   Little interest or pleasure in doing things More than half the days   Feeling down, depressed, irritable, or hopeless More than half the days   Total Score PHQ 2 4       Abuse Screening Questionnaire 5/15/2020   Do you ever feel afraid of your partner? N   Are you in a relationship with someone who physically or mentally threatens you? N   Is it safe for you to go home?  Y       ADL Assessment 5/15/2020   Feeding yourself No Help Needed   Getting from bed to chair No Help Needed   Getting dressed No Help Needed   Bathing or showering No Help Needed   Walk across the room (includes cane/walker) No Help Needed   Using the telphone No Help Needed   Taking your medications No Help Needed   Preparing meals No Help Needed   Managing money (expenses/bills) No Help Needed   Moderately strenuous housework (laundry) No Help Needed   Shopping for personal items (toiletries/medicines) No Help Needed   Shopping for groceries No Help Needed   Driving Help Needed   Climbing a flight of stairs No Help Needed   Getting to places beyond walking distances Help Needed

## 2020-05-15 NOTE — PROGRESS NOTES
Fang Saenz is a 60 y.o. male who was phone evaluated on 5/15/2020. Consent:  He and/or his healthcare decision maker is aware that this patient-initiated Telehealth encounter is a billable service, with coverage as determined by his insurance carrier. He is aware that he may receive a bill and has provided verbal consent to proceed: Yes    I was at home while conducting this encounter. Assessment & Plan:       ICD-10-CM ICD-9-CM    1. Arthritis of right hip M16.11 716.95 naproxen (NAPROSYN) 500 mg tablet      REFERRAL TO ORTHOPEDICS   2. Essential hypertension I10 401.9 hydroCHLOROthiazide (HYDRODIURIL) 25 mg tablet   3. Tobacco abuse Z72.0 305.1      Orders Placed This Encounter    REFERRAL TO ORTHOPEDICS     Referral Priority:   Routine     Referral Type:   Consultation     Referral Reason:   Specialty Services Required     Referred to Provider:   Brandie Matthews MD    naproxen (NAPROSYN) 500 mg tablet     Sig: Take 1 Tab by mouth two (2) times daily (with meals). As needed     Dispense:  60 Tab     Refill:  0    hydroCHLOROthiazide (HYDRODIURIL) 25 mg tablet     Sig: TAKE ONE TABLET BY MOUTH DAILY     Dispense:  90 Tab     Refill:  3     Rx of arthritis as above if no better needs to see DrWInlupe    The patient was counseled on the dangers of tobacco use, and was advised to quit. Reviewed strategies to maximize success, including pharmacotherapy (wellbutrin). Set quit day  HTN rf HCTZ  712  Subjective:      [place] to [place]. C/O pain in the right hip to the knee. Sx x 3 days. No injury or strain. Sl back pain. No swelling  Down to 1 per day  Finished PT    Reports taking blood pressure medications without side affects. No complaints of exertional chest pain, excessive shortness of breath or focal weakness. Minimal swelling in lower legs or dizziness with standing.     No Known Allergies    Social History     Socioeconomic History    Marital status:      Spouse name: Not on file    Number of children: 3    Years of education: Not on file    Highest education level: Not on file   Occupational History    Occupation: disability     Employer: NOT EMPLOYED   Social Needs    Financial resource strain: Not on file    Food insecurity     Worry: Not on file     Inability: Not on file    Transportation needs     Medical: Not on file     Non-medical: Not on file   Tobacco Use    Smoking status: Current Some Day Smoker     Packs/day: 0.50     Years: 45.00     Pack years: 22.50     Types: Cigarettes     Start date: 1974     Last attempt to quit: 10/26/2018     Years since quittin.5    Smokeless tobacco: Never Used   Substance and Sexual Activity    Alcohol use: Yes     Alcohol/week: 27.0 standard drinks     Types: 6 Cans of beer, 21 Standard drinks or equivalent per week    Drug use: Yes     Frequency: 1.0 times per week     Types: Cocaine     Comment: used a few months ago    Sexual activity: Yes     Partners: Female   Lifestyle    Physical activity     Days per week: Not on file     Minutes per session: Not on file    Stress: Not on file   Relationships    Social connections     Talks on phone: Not on file     Gets together: Not on file     Attends Sabianism service: Not on file     Active member of club or organization: Not on file     Attends meetings of clubs or organizations: Not on file     Relationship status: Not on file    Intimate partner violence     Fear of current or ex partner: Not on file     Emotionally abused: Not on file     Physically abused: Not on file     Forced sexual activity: Not on file   Other Topics Concern    Not on file   Social History Narrative    Friend stays with him           We discussed the expected course, resolution and complications of the diagnosis(es) in detail. Medication risks, benefits, costs, interactions, and alternatives were discussed as indicated.   I advised him to contact the office if his condition worsens, changes or fails to improve as anticipated. He expressed understanding with the diagnosis(es) and plan. Pursuant to the emergency declaration under the [facility], [facility] waiver authority and the [employer] Act, this Virtual  Visit was conducted, with patient's consent, to reduce the patient's risk of exposure to COVID-19 and provide continuity of care for an established patient. Services were provided through a video synchronous discussion virtually to substitute for in-person clinic visit.     Jennifer Chaudhary MD

## 2020-05-15 NOTE — LETTER
5/15/2020 9:31 AM 
 
Mr. [address] RE:  REFERRAL TO ORTHOPEDICS Dear Mr. Michel Ignacio: 
 
Annalisa Gu you will find the above named referral.  Please do hot hesitate to contact this office if you have any questions. Sincerely, Pillo Campos MD

## 2020-08-19 ENCOUNTER — VIRTUAL VISIT (OUTPATIENT)
Dept: INTERNAL MEDICINE CLINIC | Age: 65
End: 2020-08-19
Payer: MEDICARE

## 2020-08-19 DIAGNOSIS — S72.111D CLOSED DISPLACED FRACTURE OF GREATER TROCHANTER OF RIGHT FEMUR WITH ROUTINE HEALING, SUBSEQUENT ENCOUNTER: ICD-10-CM

## 2020-08-19 DIAGNOSIS — T84.050D: ICD-10-CM

## 2020-08-19 DIAGNOSIS — I10 ESSENTIAL HYPERTENSION: ICD-10-CM

## 2020-08-19 DIAGNOSIS — M16.11 ARTHRITIS OF RIGHT HIP: Primary | ICD-10-CM

## 2020-08-19 PROCEDURE — 99214 OFFICE O/P EST MOD 30 MIN: CPT | Performed by: FAMILY MEDICINE

## 2020-08-19 PROCEDURE — G8756 NO BP MEASURE DOC: HCPCS | Performed by: FAMILY MEDICINE

## 2020-08-19 PROCEDURE — 3017F COLORECTAL CA SCREEN DOC REV: CPT | Performed by: FAMILY MEDICINE

## 2020-08-19 PROCEDURE — G8420 CALC BMI NORM PARAMETERS: HCPCS | Performed by: FAMILY MEDICINE

## 2020-08-19 PROCEDURE — G8432 DEP SCR NOT DOC, RNG: HCPCS | Performed by: FAMILY MEDICINE

## 2020-08-19 PROCEDURE — G8428 CUR MEDS NOT DOCUMENT: HCPCS | Performed by: FAMILY MEDICINE

## 2020-08-19 RX ORDER — HYDROCODONE BITARTRATE AND ACETAMINOPHEN 5; 325 MG/1; MG/1
1 TABLET ORAL
Qty: 12 TAB | Refills: 0 | Status: SHIPPED | OUTPATIENT
Start: 2020-08-19 | End: 2020-08-23

## 2020-08-19 NOTE — PROGRESS NOTES
Galilea Dubon is a 63 y.o. male who was phone evaluated on 8/19/2020. Consent:  He and/or his healthcare decision maker is aware that this patient-initiated Telehealth encounter is a billable service, with coverage as determined by his insurance carrier. He is aware that he may receive a bill and has provided verbal consent to proceed: Yes    I was in the office while conducting this encounter. Assessment & Plan:       ICD-10-CM ICD-9-CM    1. Arthritis of right hip  M16.11 716.95 HYDROcodone-acetaminophen (NORCO) 5-325 mg per tablet   2. Essential hypertension  I10 401.9    3. Closed displaced fracture of greater trochanter of right femur with routine healing, subsequent encounter  S72.111D V54.13    4. 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 Max Daily Amount: 4 Tabs. Dispense:  12 Tab     Refill:  0     ! X narc for this prob will not RF, he understands this. Likely pain will cont and he will need to see ortho to further eval. He will schedule. Has had this pain before but it got better. Patient was looked up in the . There are multiple prescribers of controlled substances  Got some from ortho few mo ago. We discussed this pain and the usage of controlled substances for arthritis relief. In general these medications are indicated for the acute symptom relief and not for chronic usage, allthough they are frequently used for chronic management  After a certain period of time they should be stopped to avoid dependence and/or addiction. I warned him about the dangers of addiction and other side affects including respiratory depression and death.  Discussed how this is a controlled substance and that the prescribing of it is should be monitored very carefully. Drug usage is also monitored by our practise and the [facility], since there has been a lot of abuse and diversion of controlled substances. In general we do not refill this medication over the phone. PLease ask for enough pills to get you to your next appointment and make sure you keep your appointments. Warned not to take other medications like alcohol, other benzodiazapines marijuana or other narcotics when on this medication. ! X only narc must contact ortho to schedule appt. The patient was counseled on the dangers of tobacco use, and was advised to quit. Reviewed strategies to maximize success, including stress management. 712  Subjective:      Routine f/u of hip pain x mo, has had right hip replacement 20 yrs ago at the [place], tried advil. Seen previously 4 this. No trauma, saw Izzyd, every thing was good. However 5- 7 days hurting more.       No Known Allergies    Patient Active Problem List   Diagnosis Code    Closed fracture of greater trochanter of femur ([facility]) S72.113A    Periprosthetic osteolysis of internal prosthetic joint ([facility]) T84.059A    Essential hypertension I10    Numbness and tingling of right arm R20.0, R20.2       Social History     Socioeconomic History    Marital status:      Spouse name: Not on file    Number of children: 3    Years of education: Not on file    Highest education level: Not on file   Occupational History    Occupation: disability     Employer: NOT EMPLOYED   Social Needs    Financial resource strain: Not on file    Food insecurity     Worry: Not on file     Inability: Not on file    Transportation needs     Medical: Not on file     Non-medical: Not on file   Tobacco Use    Smoking status: Current Some Day Smoker     Packs/day: 0.50     Years: 45.00     Pack years: 22.50     Types: Cigarettes     Start date: 1974     Last attempt to quit: 10/26/2018     Years since quittin.8    Smokeless tobacco: Never Used   Substance and Sexual Activity    Alcohol use: Yes     Alcohol/week: 27.0 standard drinks     Types: 6 Cans of beer, 21 Standard drinks or equivalent per week    Drug use: Yes     Frequency: 1.0 times per week     Types: Cocaine     Comment: used a few months ago    Sexual activity: Yes     Partners: Female   Lifestyle    Physical activity     Days per week: Not on file     Minutes per session: Not on file    Stress: Not on file   Relationships    Social connections     Talks on phone: Not on file     Gets together: Not on file     Attends Restoration service: Not on file     Active member of club or organization: Not on file     Attends meetings of clubs or organizations: Not on file     Relationship status: Not on file    Intimate partner violence     Fear of current or ex partner: Not on file     Emotionally abused: Not on file     Physically abused: Not on file     Forced sexual activity: Not on file   Other Topics Concern    Not on file   Social History Narrative    Lives by himself in [place] [address] [place]      We discussed the expected course, resolution and complications of the diagnosis(es) in detail. Medication risks, benefits, costs, interactions, and alternatives were discussed as indicated. I advised him to contact the office if his condition worsens, changes or fails to improve as anticipated. He expressed understanding with the diagnosis(es) and plan. Pursuant to the emergency declaration under the [facility] waiver authority and the [employer] Act, this Virtual  Visit was conducted, with patient's consent, to reduce the patient's risk of exposure to COVID-19 and provide continuity of care for an established patient. Services were provided through a video synchronous discussion virtually to substitute for in-person clinic visit.     Christopher Smith MD

## 2020-08-19 NOTE — PROGRESS NOTES
Chief Complaint   Patient presents with    Hip Pain     pain med refill     Health Maintenance reviewed. I have reviewed the patient's medical history in detail and updated the computerized patient record. Patient has not been out of the country in (5-6 months), NO diarrhea, NO cough, NO chest conjestion, NO temp. Pt has not been around anyone with these symptoms. 1. Have you been to the ER, urgent care clinic since your last visit? Hospitalized since your last visit?no    2. Have you seen or consulted any other health care providers outside of the [address] [place] [address] since your last visit? Include any pap smears or colon screening. No      Encouraged pt to discuss pt's wishes with spouse/partner/family and bring them in the next appt to follow thru with the Advanced Directive    Fall Risk Assessment, last 12 mths 8/19/2020   Able to walk? Yes   Fall in past 12 months? No   Fall with injury? -   Number of falls in past 12 months -   Fall Risk Score -       3 most recent PHQ Screens 8/19/2020   Little interest or pleasure in doing things More than half the days   Feeling down, depressed, irritable, or hopeless More than half the days   Total Score PHQ 2 4       Abuse Screening Questionnaire 8/19/2020   Do you ever feel afraid of your partner? N   Are you in a relationship with someone who physically or mentally threatens you? N   Is it safe for you to go home?  Y       ADL Assessment 8/19/2020   Feeding yourself No Help Needed   Getting from bed to chair No Help Needed   Getting dressed No Help Needed   Bathing or showering No Help Needed   Walk across the room (includes cane/walker) No Help Needed   Using the telphone No Help Needed   Taking your medications No Help Needed   Preparing meals No Help Needed   Managing money (expenses/bills) No Help Needed   Moderately strenuous housework (laundry) No Help Needed   Shopping for personal items (toiletries/medicines) No Help Needed   Shopping for groceries No Help Needed   Driving Help Needed   Climbing a flight of stairs -   Getting to places beyond walking distances Help Needed

## 2020-12-04 DIAGNOSIS — I10 ESSENTIAL HYPERTENSION: ICD-10-CM

## 2020-12-04 RX ORDER — HYDROCHLOROTHIAZIDE 25 MG/1
TABLET ORAL
Qty: 90 TAB | Refills: 3 | Status: SHIPPED | OUTPATIENT
Start: 2020-12-04 | End: 2020-12-07 | Stop reason: SDUPTHER

## 2020-12-04 NOTE — TELEPHONE ENCOUNTER
Requested Prescriptions     Pending Prescriptions Disp Refills    hydroCHLOROthiazide (HYDRODIURIL) 25 mg tablet 90 Tab 3     Sig: TAKE ONE TABLET BY MOUTH DAILY       Pharmacy is also requesting    Accu-Chek Lindsey Meter  Accu-Chek Plus test strips  Accu-Chek Softclix Lancets  BD single use swabs

## 2020-12-07 DIAGNOSIS — I10 ESSENTIAL HYPERTENSION: ICD-10-CM

## 2020-12-07 RX ORDER — HYDROCHLOROTHIAZIDE 25 MG/1
TABLET ORAL
Qty: 90 TAB | Refills: 3 | Status: SHIPPED | OUTPATIENT
Start: 2020-12-07 | End: 2021-09-30

## 2020-12-07 NOTE — TELEPHONE ENCOUNTER
[facility] is request Rx's for:    Accu-Chek Lindsey Plus Meter  Accu-Chek Lindsey Plus Test Strips  Accu-Chek Sofclix Lancets  BD single us swabs    Requested Prescriptions     Pending Prescriptions Disp Refills    hydroCHLOROthiazide (HYDRODIURIL) 25 mg tablet 90 Tab 3     Sig: TAKE ONE TABLET BY MOUTH DAILY

## 2021-01-14 ENCOUNTER — OFFICE VISIT (OUTPATIENT)
Dept: INTERNAL MEDICINE CLINIC | Age: 66
End: 2021-01-14
Payer: MEDICARE

## 2021-01-14 VITALS
SYSTOLIC BLOOD PRESSURE: 142 MMHG | WEIGHT: 121 LBS | TEMPERATURE: 98 F | HEART RATE: 84 BPM | HEIGHT: 66 IN | RESPIRATION RATE: 16 BRPM | OXYGEN SATURATION: 95 % | BODY MASS INDEX: 19.44 KG/M2 | DIASTOLIC BLOOD PRESSURE: 91 MMHG

## 2021-01-14 DIAGNOSIS — R82.90 ABNORMAL URINE: ICD-10-CM

## 2021-01-14 DIAGNOSIS — Z72.51 UNPROTECTED SEX: ICD-10-CM

## 2021-01-14 DIAGNOSIS — B99.8 OTHER INFECTIOUS DISEASE: ICD-10-CM

## 2021-01-14 DIAGNOSIS — R36.9 PENILE DISCHARGE: Primary | ICD-10-CM

## 2021-01-14 DIAGNOSIS — I10 ESSENTIAL HYPERTENSION: ICD-10-CM

## 2021-01-14 LAB
BILIRUB UR QL STRIP: NEGATIVE
GLUCOSE UR-MCNC: NEGATIVE MG/DL
KETONES P FAST UR STRIP-MCNC: NEGATIVE MG/DL
PH UR STRIP: 6.5 [PH] (ref 4.6–8)
PROT UR QL STRIP: NEGATIVE
SP GR UR STRIP: 1.02 (ref 1–1.03)
UA UROBILINOGEN AMB POC: NORMAL (ref 0.2–1)
URINALYSIS CLARITY POC: NORMAL
URINALYSIS COLOR POC: NORMAL
URINE BLOOD POC: NORMAL
URINE LEUKOCYTES POC: NORMAL
URINE NITRITES POC: NEGATIVE

## 2021-01-14 PROCEDURE — G8753 SYS BP > OR = 140: HCPCS | Performed by: NURSE PRACTITIONER

## 2021-01-14 PROCEDURE — G8427 DOCREV CUR MEDS BY ELIG CLIN: HCPCS | Performed by: NURSE PRACTITIONER

## 2021-01-14 PROCEDURE — G8755 DIAS BP > OR = 90: HCPCS | Performed by: NURSE PRACTITIONER

## 2021-01-14 PROCEDURE — G8420 CALC BMI NORM PARAMETERS: HCPCS | Performed by: NURSE PRACTITIONER

## 2021-01-14 PROCEDURE — 99214 OFFICE O/P EST MOD 30 MIN: CPT | Performed by: NURSE PRACTITIONER

## 2021-01-14 PROCEDURE — 81001 URINALYSIS AUTO W/SCOPE: CPT | Performed by: NURSE PRACTITIONER

## 2021-01-14 PROCEDURE — G8432 DEP SCR NOT DOC, RNG: HCPCS | Performed by: NURSE PRACTITIONER

## 2021-01-14 PROCEDURE — 3017F COLORECTAL CA SCREEN DOC REV: CPT | Performed by: NURSE PRACTITIONER

## 2021-01-14 PROCEDURE — 96372 THER/PROPH/DIAG INJ SC/IM: CPT | Performed by: NURSE PRACTITIONER

## 2021-01-14 RX ORDER — AZITHROMYCIN 500 MG/1
1000 TABLET, FILM COATED ORAL
Qty: 2 TAB | Refills: 0 | Status: SHIPPED | OUTPATIENT
Start: 2021-01-14 | End: 2021-01-14

## 2021-01-14 RX ORDER — CEFTRIAXONE 250 MG/8ML
250 INJECTION, POWDER, FOR SOLUTION INTRAMUSCULAR; INTRAVENOUS ONCE
Qty: 1 VIAL | Refills: 0
Start: 2021-01-14 | End: 2021-01-14 | Stop reason: ALTCHOICE

## 2021-01-14 RX ORDER — CEFTRIAXONE 250 MG/8ML
250 INJECTION, POWDER, FOR SOLUTION INTRAMUSCULAR; INTRAVENOUS ONCE
Qty: 250 MG | Refills: 0
Start: 2021-01-14 | End: 2021-01-14

## 2021-01-14 NOTE — PATIENT INSTRUCTIONS
Safer Sex: Care Instructions Your Care Instructions Safer sex is a way to reduce your risk of getting an infection spread through sex. It can also help prevent pregnancy. Most infections that are spread through sex, also called sexually transmitted infections or STIs, can be cured. But some can decrease your chances of getting pregnant if they are not treated early. Others, such as herpes, have no cure. And some, such as HIV, can be deadly. Several products can help you practice safer sex and reduce your chance of STIs. One of the best is a condom. There are condoms for men and for women. The female condom is a tube of soft plastic with a closed end that is placed deep into the vagina. You can use a special rubber sheet (dental dam) for protection during oral sex. Disposable gloves can keep your hands from touching blood, semen, or other body fluids that can carry infections. Remember that birth control methods such as diaphragms, IUDs, foams, and birth control pills do not stop you from getting STIs. Follow-up care is a key part of your treatment and safety. Be sure to make and go to all appointments, and call your doctor if you are having problems. It's also a good idea to know your test results and keep a list of the medicines you take. How can you care for yourself at home? · Think about getting shots to prevent hepatitis A and hepatitis B. These two diseases can be spread through sex. You also can get hepatitis A if you eat infected food. · Use condoms or female condoms each time and every time you have sex. · Learn the right way to use a male condom: 
? Condoms come in several sizes. Make sure you use the right size. A condom that is too small can break easily. A condom that is too big can slip off during sex. Use a new condom each time you have sex. ? Be careful not to poke a hole in the condom when you open the wrapper. ? Squeeze the tip of the condom to keep out air. ? Pull down the loose skin (foreskin) from the head of an uncircumcised penis. ? While squeezing the tip of the condom, unroll it all the way down to the base of the firm penis. ? Never use petroleum jelly (such as Vaseline), grease, hand lotion, baby oil, or anything with oil in it. These products can make holes in the condom. ? After sex, hold the condom on your penis as you remove your penis from your partner. This will keep semen from spilling out of the condom. · Learn to use a female condom: ? You can put in a female condom up to 8 hours before sex. ? Squeeze the smaller ring at the closed end and insert it deep into the vagina. The larger ring at the open end should stay outside the vagina. ? During sex, make sure the penis goes into the condom. ? After the penis is removed, close the open end of the condom by twisting it. Remove the condom. · Do not use a female condom and male condom at the same time. · Do not have sex with anyone who has symptoms of an STI, such as sores on the genitals or mouth. The herpes virus that causes cold sores can spread to and from the penis and vagina. · Do not drink a lot of alcohol or use drugs before sex. This can cause you to let down your guard and not practice safer sex. · Having one sex partner (who does not have STIs and does not have sex with anyone else) is a sure way to avoid STIs. · Talk to your partner before you have sex. Find out if he or she has or is at risk for any STI. Keep in mind that a person may be able to spread an STI even if he or she does not have symptoms. You and your partner may want to get an HIV test. You should get tested again 6 months later. Where can you learn more? Go to http://www.gray.com/ Enter F165 in the search box to learn more about \"Safer Sex: Care Instructions. \" Current as of: February 26, 2020               Content Version: 12.6 © 8373-9546 [employer]. Care instructions adapted under license by [employer] (which disclaims liability or warranty for this information). If you have questions about a medical condition or this instruction, always ask your healthcare professional. [address] any warranty or liability for your use of this information. Exposure to Sexually Transmitted Infections: Care Instructions Your Care Instructions Sexually transmitted infections (STIs) are those diseases spread by sexual contact. There are at least 20 different STIs, including chlamydia, gonorrhea, syphilis, and human immunodeficiency virus (HIV), which causes AIDS. Bacteria-caused STIs can be treated and cured. STIs caused by viruses, such as HIV, can be treated but not cured. Some STIs can reduce a woman's chances of getting pregnant in the future. STIs are spread during sexual contact, such as vaginal intercourse and oral or anal sex. Follow-up care is a key part of your treatment and safety. Be sure to make and go to all appointments, and call your doctor if you are having problems. It's also a good idea to know your test results and keep a list of the medicines you take. How can you care for yourself at home? · Your doctor may have given you a shot of antibiotics. If your doctor prescribed antibiotic pills, take them as directed. Do not stop taking them just because you feel better. You need to take the full course of antibiotics. · Do not have sexual contact while you have symptoms of an STI or are being treated for an STI. · Tell your sex partner (or partners) that he or she will need treatment. · If you are a woman, do not douche. Douching changes the normal balance of bacteria in the vagina and may spread an infection up into your reproductive organs. To prevent exposure to STIs in the future · Use latex condoms every time you have sex. Use them from the beginning to the end of sexual contact. · Talk to your partner before you have sex. Find out if he or she has or is at risk for any STI. Keep in mind that a person may be able to spread an STI even if he or she does not have symptoms. · Do not have sex if you are being treated for an STI. · Do not have sex with anyone who has symptoms of an STI, such as sores on the genitals or mouth. · Having one sex partner (who does not have STIs and does not have sex with anyone else) is a good way to avoid STIs. When should you call for help? Call your doctor now or seek immediate medical care if: 
  · You have new pain in your belly or pelvis.  
  · You have symptoms of a urinary tract infection. These may include: 
? Pain or burning when you urinate. ? A frequent need to urinate without being able to pass much urine. ? Pain in the flank, which is just below the rib cage and above the waist on either side of the back. ? Blood in your urine. ? A fever.  
  · You have new or worsening pain or swelling in the scrotum. Watch closely for changes in your health, and be sure to contact your doctor if: 
  · You have unusual vaginal bleeding.  
  · You have a discharge from the vagina or penis.  
  · You have any new symptoms, such as sores, bumps, rashes, blisters, or warts.  
  · You have itching, tingling, pain, or burning in the genital or anal area.  
  · You think you may have an STI. Where can you learn more? Go to http://www.gray.com/ Enter L360 in the search box to learn more about \"Exposure to Sexually Transmitted Infections: Care Instructions. \" Current as of: February 26, 2020               Content Version: 12.6 © 9062-5909 [employer]. Care instructions adapted under license by [employer] (which disclaims liability or warranty for this information). If you have questions about a medical condition or this instruction, always ask your healthcare professional. [address] any warranty or liability for your use of this information. Azithromycin (By mouth) Azithromycin (ig-xvyi-gnr-MYE-sin) Treats infections. This medicine is a macrolide antibiotic. Brand Name(s): Zithromax, Zithromax Tri-Fazal, Zithromax Z-Fazal, Zmax There may be other brand names for this medicine. When This Medicine Should Not Be Used: This medicine is not right for everyone. Do not use it if you had an allergic reaction to azithromycin, erythromycin, or similar medicines, or you have a history of liver problems caused by azithromycin. How to Use This Medicine:  
Capsule, Liquid, Packet, Powder, Tablet · Your doctor will tell you how much medicine to use. Do not use more than directed. · Take all of the medicine in your prescription to clear up your infection, even if you feel better after the first few doses. · Read and follow the patient instructions that come with this medicine. Talk to your doctor or pharmacist if you have any questions. · Multiple dose (Zithromax® oral liquid or tablets): ¨ You may take this medicine with or without food. ¨ Shake the bottle well before you measure the medicine. Measure the oral liquid medicine with a marked measuring spoon, oral syringe, or medicine cup. · Single dose (Zmax® extended-release oral liquid or powder):  
¨ Liquid: § Take this medicine on an empty stomach at least 1 hour before you eat, or 2 hours after you eat. § Call your doctor right away if you vomit within 1 hour after you take the medicine. § You must take the liquid within 12 hours after the pharmacist gives it to you. § Shake the bottle well before you measure the medicine. Measure your dose with a marked measuring spoon, cup, or syringe. ¨ Powder:  
§ Open 1 packet and pour all of the medicine into a glass with about 2 ounces (¼ cup) of water. Mix well and drink the medicine right away. Pour another 2 ounces of water into the same glass, and drink the remaining medicine. · Missed dose: If you are taking multiple doses, take the dose as soon as you remember. If it is almost time for your next dose, wait until then to take a regular dose. Do not use extra medicine to make up for a missed dose. · Store the medicine in a closed container at room temperature, away from heat, moisture, and direct light. · Extended-release oral liquid: Do not refrigerate or freeze. · Oral liquid for 1 dose only: Store at room temperature. Do not store in the refrigerator or allow the medicine to freeze. · Oral liquid for multiple doses: Store at room temperature or in the refrigerator. Use it within 10 days of filling the prescription. Drugs and Foods to Avoid: Ask your doctor or pharmacist before using any other medicine, including over-the-counter medicines, vitamins, and herbal products. · Some medicines can affect how azithromycin works. Tell your doctor if you are also using any of the following: ¨ Cyclosporine, digoxin, nelfinavir, or phenytoin ¨ Blood thinner ¨ Ergot medicine ¨ Medicine for a heart rhythm problem (including amiodarone, dofetilide, procainamide, quinidine, sotalol) · Zithromax® for multiple doses: Do not take an antacid that contains magnesium or aluminum at the same time you take Zithromax®. An antacid will affect how the medicine works. Antacids will not affect Zmax® for single dose. Warnings While Using This Medicine: · Tell your doctor if you are pregnant or breastfeeding, or if you have kidney disease, liver disease, heart disease, heart rhythm problems, heart failure, or myasthenia gravis. Tell your doctor if anyone in your family has heart rhythm problems. · This medicine may cause the following problems:  
¨ Serious skin reactions ¨ Liver problems ¨ Infantile hypertrophic pyloric stenosis ¨ Heart rhythm problems · This medicine can cause diarrhea. Call your doctor if the diarrhea becomes severe, does not stop, or is bloody. Do not take any medicine to stop diarrhea until you have talked to your doctor. Diarrhea can occur 2 months or more after you stop taking this medicine. It may occur 2 months or more after you stop using this medicine. · Call your doctor if your symptoms do not improve or if they get worse. · Keep all medicine out of the reach of children. Never share your medicine with anyone. Possible Side Effects While Using This Medicine:  
Call your doctor right away if you notice any of these side effects: · Allergic reaction: Itching or hives, swelling in your face or hands, swelling or tingling in your mouth or throat, chest tightness, trouble breathing · Blistering, peeling, red skin rash · Dark urine, pale stools, nausea, vomiting, loss of appetite, stomach pain, yellow skin or eyes · Double vision, tiredness or weakness · Fainting, dizziness, lightheadedness · Fast, pounding, or uneven heartbeat, chest pain · Feeling irritable or vomits after feeding (in babies) · Severe diarrhea that may contain blood, stomach cramps, fever If you notice these less serious side effects, talk with your doctor: · Mild diarrhea, nausea, vomiting, stomach pain If you notice other side effects that you think are caused by this medicine, tell your doctor. Call your doctor for medical advice about side effects. You may report side effects to FDA at 9-928-BSS-0666 © 2017 [address]  Information is for End User's use only and may not be sold, redistributed or otherwise used for commercial purposes. The above information is an  only. It is not intended as medical advice for individual conditions or treatments. Talk to your doctor, nurse or pharmacist before following any medical regimen to see if it is safe and effective for you. Ceftriaxone (Rocephin, Novaplus cefTRIAXone, Amerinet Choice cefTRIAXone) - (By injection) Why this medicine is used:  
Treats infections. Contact a nurse or doctor right away if you have: · Blistering, peeling, red skin rash · Severe or bloody diarrhea · Loss of appetite, stomach pain, yellow skin or eyes · Dark urine or pale stools · Nausea, vomiting Common side effects: · Vaginal itching or discharge · Pain, redness, swelling where the needle is placed © 2017 [address]  Information is for End User's use only and may not be sold, redistributed or otherwise used for commercial purposes. Get labs done . Will notify you about results Azithromycin 1000mg today. Follow up in 1 week if symptoms have not improved.

## 2021-01-14 NOTE — PROGRESS NOTES
Chief Complaint   Patient presents with    Penile Discharge     \"had sex with a female 3 weeks ago\"       1 week ago started seeing a leaking in his underwear - \"not feeling right down there\" - denies pain or burning  Roosevelt HamMORTEZA  1/70/6653  5:28 AM    Fall Risk Assessment, last 12 mths 1/14/2021   Able to walk? Yes   Fall in past 12 months? 0   Do you feel unsteady? 0   Are you worried about falling 0   Number of falls in past 12 months -   Fall with injury? -       3 most recent PHQ Screens 1/14/2021   Little interest or pleasure in doing things Not at all   Feeling down, depressed, irritable, or hopeless Not at all   Total Score PHQ 2 0       Abuse Screening Questionnaire 1/14/2021   Do you ever feel afraid of your partner? N   Are you in a relationship with someone who physically or mentally threatens you? N   Is it safe for you to go home?  Y       ADL Assessment 1/14/2021   Feeding yourself No Help Needed   Getting from bed to chair No Help Needed   Getting dressed No Help Needed   Bathing or showering -   Walk across the room (includes cane/walker) No Help Needed   Using the telphone No Help Needed   Taking your medications No Help Needed   Preparing meals No Help Needed   Managing money (expenses/bills) No Help Needed   Moderately strenuous housework (laundry) Help Needed   Shopping for personal items (toiletries/medicines) No Help Needed   Shopping for groceries No Help Needed   Driving Help Needed   Climbing a flight of stairs No Help Needed   Getting to places beyond walking distances Help Needed

## 2021-01-14 NOTE — PROGRESS NOTES
Subjective: (As above and below)     Chief Complaint   Patient presents with    Penile Discharge     \"had sex with a female 3 weeks ago\"       HPI  He is a 63y.o. year old male who presents for evaluation. Pt reported that 3 weeks ago he had unprotected sex then  felt a \" tingling sensation in his groin area\" and saw whitish discharge from penis. Stated \" did not feel right. \"    Had syphilis in past- 30 years ago when he was in the [employer]. O:x 3 days  L: penis  D:  C: white penile discharge  A:no  R:no  T:in morning  S: 0/10    Smokes 1 pack cigs q 3 days. Drinks 6 pack beer on weekends. Moved to [facility] last April, 2020. Reviewed PmHx, RxHx, FmHx, SocHx, AllgHx and updated in chart. 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    Essential hypertension I10    Numbness and tingling of right arm R20.0, R20.2     Review of Systems: NONE  Gen: no fatigue, fever, chills  Eyes: no excessive tearing, itching, or discharge  Nose: no rhinorrhea, no sinus pain  Mouth: no oral lesions, no sore throat  Resp: no shortness of breath, no wheezing, no cough  CV: no chest pain, no paroxysmal nocturnal dyspnea  Abd: no nausea, no heartburn, no diarrhea, no constipation, no abdominal pain  Neuro: no headaches, no syncope or presyncopal episodes  Endo: no polyuria, no polydipsia  Heme: no lymphadenopathy, no easy bruising or bleeding    No Known Allergies  Current Outpatient Medications   Medication Sig    hydroCHLOROthiazide (HYDRODIURIL) 25 mg tablet TAKE ONE TABLET BY MOUTH DAILY    aspirin 81 mg chewable tablet Take 1 Tab by mouth daily.  naproxen (NAPROSYN) 500 mg tablet Take 1 Tab by mouth two (2) times daily (with meals). As needed    buPROPion (WELLBUTRIN) 100 mg tablet Take 1 Tab by mouth two (2) times a day.  After 2 weeks quit     No current facility-administered medications for this visit. Objective:     Visit Vitals  BP (!) 142/91 (BP 1 Location: Left arm, BP Patient Position: At rest)   Pulse 84   Temp 98 °F (36.7 °C) (Oral)   Resp 16   Ht 5' 6\" (1.676 m)   Wt 121 lb (54.9 kg)   SpO2 95%   BMI 19.53 kg/m²      Physical Examination:   Gen: alert, oriented, no acute distress  Head: normocephalic, atraumatic  Oral: moist mucus membranes, no oral lesions, no pharyngeal inflammation or exudate  Resp: no increased work of breathing, lungs clear to ausculation bilaterally  CV: S1, S2 normal, no murmurs, rubs, or gallops. Abd: soft, not tender, not distended. No hepatosplenomegaly. Normal bowel sounds. No hernias. Skin: no lesion or rash  : deferred    Assessment/ Plan:   Consulted with . ICD-10-CM ICD-9-CM    1. Penile discharge  R36.9 788.7    2. Unprotected sex  Z72.51 V69.2    3. Essential hypertension  I10 401.9    4. Abnormal urine  R82.90 791.9      1. Penile discharge  2. Unprotected sex  3. Essential hypertension  4. Abnormal urine    Get urine/ labs done checking for STI's ( NG, CT, TV and Syphilis/ HIV)  Will notify with results  Azithromycin 1000mg today x 1 dose. Cetriaxone 250mg IM in office. Follow up in 1 week if symptoms have not improved. I have discussed the diagnosis with the patient and the intended plan as seen in the above orders. The patient has received an after-visit summary and questions were answered concerning future plans. If symptoms worsen, go to the ER.     Medication Side Effects and Warnings were discussed with patient: yes  Patient Labs were reviewed: yes  Patient Past Records were reviewed:  yes    Theresa Hunter NP

## 2021-01-16 LAB — BACTERIA UR CULT: NO GROWTH

## 2021-01-17 LAB
C TRACH RRNA SPEC QL NAA+PROBE: NEGATIVE
N GONORRHOEA RRNA SPEC QL NAA+PROBE: NEGATIVE
T VAGINALIS DNA SPEC QL NAA+PROBE: POSITIVE

## 2021-01-18 ENCOUNTER — TELEPHONE (OUTPATIENT)
Dept: INTERNAL MEDICINE CLINIC | Age: 66
End: 2021-01-18

## 2021-01-18 DIAGNOSIS — A64 STI (SEXUALLY TRANSMITTED INFECTION): Primary | ICD-10-CM

## 2021-01-18 RX ORDER — METRONIDAZOLE 500 MG/1
500 TABLET ORAL 2 TIMES DAILY
Qty: 14 TAB | Refills: 0 | Status: SHIPPED | OUTPATIENT
Start: 2021-01-18 | End: 2021-01-25

## 2021-01-18 NOTE — PROGRESS NOTES
Informed pt that his urine resulted with Trichomonas in it.   Advised Metronidazole 500mg BID for 7 days. Advised to let partner know.  Advised that if he can contact her to encourage her to get tested and treated.   Please send letter.

## 2021-01-18 NOTE — TELEPHONE ENCOUNTER
Informed pt that his urine resulted with Trichomonas in it. Advised Metronidazole 500mg BID for 7 days. Advised to let partner know. He stated he did not have her phone number and met her at a club one night. Advised that if he can contact her to encourage her to get tested and treated.

## 2021-01-19 ENCOUNTER — TELEPHONE (OUTPATIENT)
Dept: INTERNAL MEDICINE CLINIC | Age: 66
End: 2021-01-19

## 2021-01-19 NOTE — TELEPHONE ENCOUNTER
FROM TRACEY Wilhelm, [address] [facility]             General Message/Vendor Calls     Caller's first and last name: NA       Reason for call:confirm Rx was sent to the correct pharm. Callback required yes/no and why: Yes       Best contact number(s):[phone]       Details to clarify the request: Homer Yeung at [facility] , [phone], please call to confirm Rx sent to the correct pharm.        Stuart Hines [address]

## 2021-01-20 NOTE — TELEPHONE ENCOUNTER
Patient states he already picked up medication from the pharmacy - also requests that we send his medical records to [facility] in [place], [place] - he has moved to that area and will see a doctor closer to him since he doesn't drive  [facility], MORTEZA  0/43/2516  9:31 PM

## 2021-02-18 ENCOUNTER — TELEPHONE (OUTPATIENT)
Dept: INTERNAL MEDICINE CLINIC | Age: 66
End: 2021-02-18

## 2021-02-18 NOTE — TELEPHONE ENCOUNTER
Message fr envera        General Message/Vendor Calls     Caller's first and last name: Pt       Reason for call: Requesting a call back in regards to last treatment he had       Callback required yes/no and why: Yes       Best contact number(s): [phone]       Details to clarify the request:       Rachana Flores

## 2022-03-18 PROBLEM — I10 ESSENTIAL HYPERTENSION: Status: ACTIVE | Noted: 2017-04-25

## 2022-03-19 PROBLEM — R20.2 NUMBNESS AND TINGLING OF RIGHT ARM: Status: ACTIVE | Noted: 2019-07-10

## 2022-03-19 PROBLEM — R20.0 NUMBNESS AND TINGLING OF RIGHT ARM: Status: ACTIVE | Noted: 2019-07-10

## 2023-05-20 RX ORDER — HYDROCHLOROTHIAZIDE 25 MG/1
1 TABLET ORAL DAILY
COMMUNITY
Start: 2021-09-30

## 2023-05-20 RX ORDER — NAPROXEN 500 MG/1
500 TABLET ORAL 2 TIMES DAILY WITH MEALS
COMMUNITY
Start: 2020-05-15

## 2023-05-20 RX ORDER — ASPIRIN 81 MG/1
81 TABLET, CHEWABLE ORAL DAILY
COMMUNITY
Start: 2015-12-18

## (undated) DEVICE — KIT,1200CC CANISTER,3/16"X6' TUBING: Brand: MEDLINE INDUSTRIES, INC.

## (undated) DEVICE — TOWEL SURG W17XL27IN STD BLU COT NONFENESTRATED PREWASHED

## (undated) DEVICE — SPONGE GZ W4XL4IN COT RADPQ HIGHLY ABSRB

## (undated) DEVICE — STERILE POLYISOPRENE POWDER-FREE SURGICAL GLOVES: Brand: PROTEXIS

## (undated) DEVICE — SYR 10ML LUER LOK 1/5ML GRAD --

## (undated) DEVICE — TUBING, SUCTION, 1/4" X 10', STRAIGHT: Brand: MEDLINE

## (undated) DEVICE — MARKER,SKIN,WI/RULER AND LABELS: Brand: MEDLINE

## (undated) DEVICE — COVER,TABLE,60X90,STERILE: Brand: MEDLINE

## (undated) DEVICE — HANDLE LT SNAP ON ULT DURABLE LENS FOR TRUMPF ALC DISPOSABLE

## (undated) DEVICE — SYR 10ML CTRL LR LCK NSAF LF --

## (undated) DEVICE — SYRINGE,EAR/ULCER, 2 OZ, STERILE: Brand: MEDLINE

## (undated) DEVICE — APPLICATOR COT-TIP 6IN WOOD -- 2/PK STRL

## (undated) DEVICE — PACKING 8004000 NEURAY 200PK 13X13MM: Brand: NEURAY ®

## (undated) DEVICE — GUARD TEETH AD NYL FOR LARYNSCP POS PROTCT

## (undated) DEVICE — SOLUTION IV 1000ML 0.9% SOD CHL

## (undated) DEVICE — STRAP,POSITIONING,KNEE/BODY,FOAM,4X60": Brand: MEDLINE

## (undated) DEVICE — KIT,ANTI FOG,W/SPONGE & FLUID,SOFT PACK: Brand: MEDLINE

## (undated) DEVICE — INFECTION CONTROL KIT SYS